# Patient Record
Sex: MALE | Race: AMERICAN INDIAN OR ALASKA NATIVE | NOT HISPANIC OR LATINO | URBAN - METROPOLITAN AREA
[De-identification: names, ages, dates, MRNs, and addresses within clinical notes are randomized per-mention and may not be internally consistent; named-entity substitution may affect disease eponyms.]

---

## 2023-02-08 ENCOUNTER — TRANSFERRED RECORDS (OUTPATIENT)
Dept: HEALTH INFORMATION MANAGEMENT | Facility: CLINIC | Age: 28
End: 2023-02-08

## 2023-02-09 ENCOUNTER — TELEPHONE (OUTPATIENT)
Dept: BEHAVIORAL HEALTH | Facility: HOSPITAL | Age: 28
End: 2023-02-09

## 2023-02-09 ENCOUNTER — HOSPITAL ENCOUNTER (INPATIENT)
Facility: HOSPITAL | Age: 28
LOS: 6 days | Discharge: HOME | End: 2023-02-15
Attending: PSYCHIATRY & NEUROLOGY | Admitting: STUDENT IN AN ORGANIZED HEALTH CARE EDUCATION/TRAINING PROGRAM
Payer: MEDICAID

## 2023-02-09 DIAGNOSIS — F33.2 SEVERE RECURRENT MAJOR DEPRESSION WITHOUT PSYCHOTIC FEATURES (H): ICD-10-CM

## 2023-02-09 DIAGNOSIS — E11.65 UNCONTROLLED TYPE 2 DIABETES MELLITUS WITH HYPERGLYCEMIA (H): Primary | ICD-10-CM

## 2023-02-09 LAB
ALT SERPL-CCNC: 35.9 U/L
AST SERPL-CCNC: 21.6 U/L
CREATININE (EXTERNAL): 0.6 MG/DL (ref 0.7–1.2)
GFR ESTIMATED (EXTERNAL): 136.83 ML/MIN/1.73M2
GLUCOSE (EXTERNAL): 374.1 MG/DL (ref 74–109)
GLUCOSE BLDC GLUCOMTR-MCNC: 250 MG/DL (ref 70–99)
GLUCOSE BLDC GLUCOMTR-MCNC: 341 MG/DL (ref 70–99)
GLUCOSE BLDC GLUCOMTR-MCNC: 403 MG/DL (ref 70–99)
POTASSIUM (EXTERNAL): 3.9 MMOL/L (ref 3.4–4.5)

## 2023-02-09 PROCEDURE — 124N000001 HC R&B MH

## 2023-02-09 RX ORDER — HYDROXYZINE HYDROCHLORIDE 25 MG/1
25 TABLET, FILM COATED ORAL EVERY 4 HOURS PRN
Status: DISCONTINUED | OUTPATIENT
Start: 2023-02-09 | End: 2023-02-15 | Stop reason: HOSPADM

## 2023-02-09 RX ORDER — CLONIDINE HYDROCHLORIDE 0.1 MG/1
0.1 TABLET ORAL 3 TIMES DAILY PRN
Status: ON HOLD | COMMUNITY
End: 2023-02-15

## 2023-02-09 RX ORDER — TRAZODONE HYDROCHLORIDE 100 MG/1
100 TABLET ORAL AT BEDTIME
Status: ON HOLD | COMMUNITY
End: 2023-02-15

## 2023-02-09 RX ORDER — DEXTROSE MONOHYDRATE 25 G/50ML
25-50 INJECTION, SOLUTION INTRAVENOUS
Status: DISCONTINUED | OUTPATIENT
Start: 2023-02-09 | End: 2023-02-15 | Stop reason: HOSPADM

## 2023-02-09 RX ORDER — PRAZOSIN HYDROCHLORIDE 1 MG/1
1 CAPSULE ORAL AT BEDTIME
Status: ON HOLD | COMMUNITY
End: 2023-02-15

## 2023-02-09 RX ORDER — METFORMIN HCL 500 MG
1000 TABLET, EXTENDED RELEASE 24 HR ORAL 2 TIMES DAILY WITH MEALS
Status: ON HOLD | COMMUNITY
End: 2023-02-10

## 2023-02-09 RX ORDER — ESCITALOPRAM OXALATE 20 MG/1
20 TABLET ORAL DAILY
Status: ON HOLD | COMMUNITY
End: 2023-02-15

## 2023-02-09 RX ORDER — OLANZAPINE 10 MG/1
10 TABLET ORAL 3 TIMES DAILY PRN
Status: DISCONTINUED | OUTPATIENT
Start: 2023-02-09 | End: 2023-02-15 | Stop reason: HOSPADM

## 2023-02-09 RX ORDER — NICOTINE POLACRILEX 4 MG
15-30 LOZENGE BUCCAL
Status: DISCONTINUED | OUTPATIENT
Start: 2023-02-09 | End: 2023-02-15 | Stop reason: HOSPADM

## 2023-02-09 RX ORDER — OLANZAPINE 10 MG/2ML
10 INJECTION, POWDER, FOR SOLUTION INTRAMUSCULAR 3 TIMES DAILY PRN
Status: DISCONTINUED | OUTPATIENT
Start: 2023-02-09 | End: 2023-02-15 | Stop reason: HOSPADM

## 2023-02-09 RX ORDER — HYDROXYZINE HYDROCHLORIDE 50 MG/1
50 TABLET, FILM COATED ORAL EVERY 6 HOURS PRN
Status: ON HOLD | COMMUNITY
End: 2023-02-10

## 2023-02-09 RX ORDER — LURASIDONE HYDROCHLORIDE 60 MG/1
60 TABLET, FILM COATED ORAL AT BEDTIME
Status: ON HOLD | COMMUNITY
End: 2023-02-10

## 2023-02-09 ASSESSMENT — ACTIVITIES OF DAILY LIVING (ADL)
DIFFICULTY_COMMUNICATING: NO
EQUIPMENT_CURRENTLY_USED_AT_HOME: CANE, QUAD
ADLS_ACUITY_SCORE: 30
CONCENTRATING,_REMEMBERING_OR_MAKING_DECISIONS_DIFFICULTY: NO
CHANGE_IN_FUNCTIONAL_STATUS_SINCE_ONSET_OF_CURRENT_ILLNESS/INJURY: NO
FALL_HISTORY_WITHIN_LAST_SIX_MONTHS: NO
DIFFICULTY_EATING/SWALLOWING: NO
DRESSING/BATHING_DIFFICULTY: NO
DOING_ERRANDS_INDEPENDENTLY_DIFFICULTY: NO
TOILETING_ISSUES: NO
WALKING_OR_CLIMBING_STAIRS_DIFFICULTY: NO
ADLS_ACUITY_SCORE: 30
WEAR_GLASSES_OR_BLIND: YES

## 2023-02-09 NOTE — TELEPHONE ENCOUNTER
S: Outside Facility [facility] , Charge RN Gurwinder calling at [phone].  27 year old/Male presenting with suicidal ideations with plan    B: Pt arrived via self-transport. Pt presents with increasing suicidal thoughts with plan. He has not been taking care of his diabetes and has been increasingly depressed with thoughts of taking all of his diabetic medications or cutting his wrists.  Pt affect in ED: flat, tearful  Pt Dx: Major Depressive Disorder  Previous IP hx? No  Pt endorses SI. Pt denies SIB.   Pt denies HI. Pt denies hallucinations.   Hx of suicide attempt? Unknown  Hx of aggression, or current concerns for aggression this visit? No  Pt is not prescribed medication.   Pt denies OP services  CD concerns: not at this time  Acute medical concerns: uncontrolled diabetes  Does Pt present with any of the following: assistive devices, insulin pump, J/G tube, catheter, CPAP, continuous IV, continuous O2, bariatric needs, ADA needs? No  Is Pt their own guardian? Yes  Pt is ambulatory  Pt is  able to perform ADLs independently    A: Pt meets criteria for review for Critical access hospital admission. Patient on a 72-hour hold. Hold initiated 2/9/23 at 0345  COVID: Negative  Utox: Negative  CMP: Abnormalities: Na+ 134 (136-145)  CBC: WNL  HCG: N/A    R: Patient accepted for behavioral bed placement: Yes        Accepted by Provider Dr. Villar    Admission to Inpatient Level of Care is indicated due to:     1. Patient risk of severity of behavioral health disorder is appropriate to proposed level of care as indicated by:   a. Imminent risk of harm to self Yes describe: increasing suicidal thinking. Thoughts of overdosing on diabetic medication or cutting wrists  b. Imminent risk of harm to others No   And/or  Behavioral health disorder is present and appropriate for inpatient care with both of the following:   a.  Severe psychiatric, behavioral or other comorbid conditions: Yes describe increased depression and suicidal thinking  b.   Severe dysfunction in daily living is present: Yes describe not taking care of his medical concerns and thinking of overdosing on his diabetic medications.   2.  Inpatient mental health services are necessary to meet patient needs based on:   a. Specific condition related to admission diagnosis is present and will likely improve with treatment at an inpatient level of care: Yes  b. Specific condition related to admission diagnosis will likely deteriorate in the absence of treatment at an inpatient level of care: Yes    3.  Situation and expectations are appropriate for inpatient care, as indicated by  one of the following:     A. Patient is unwilling to participate in treatment voluntarily and requires treatment. Yes   B. Is voluntary treatment at lower level of care feasible No  C. Around the clock medical and nursing care is for symptoms is required: Yes  D. Patient management at lower level of care is not appropriate and  biopsychosocial stressors may be contributing to clinical presentation. Yes

## 2023-02-09 NOTE — LETTER
[address] [address]  [phone]      February 15, 2023    Corie Granda Jr.   [id number]  [facility] [id number] [phone] (home)     : 1995      To Whom it may concern:    Corie Granda was hospitalized from 23 to 02/15/23.  Please excuse Corie from any work responsibilities during this time.  Additionally, it is recommended that Corie attend an Intensive Residential Treatment Services (IRTS) program post-hospitalization that he attend begin to attend in the coming week(s) when there is an intake bed available.  Please excuse Corie from any work responsibilities for the duration of the IRTS program which typically range in dates from 30 to 90 days. I do not anticipate any work restrictions when Corie does return to work outside of follow-up appointments with his medical providers. Thank you for your attention to this matter.       Sincerely,      Carlos Villar MD

## 2023-02-10 PROBLEM — E11.65 UNCONTROLLED TYPE 2 DIABETES MELLITUS WITH HYPERGLYCEMIA (H): Status: ACTIVE | Noted: 2021-09-21

## 2023-02-10 LAB
ANION GAP SERPL CALCULATED.3IONS-SCNC: 13 MMOL/L (ref 7–15)
BUN SERPL-MCNC: 15.3 MG/DL (ref 6–20)
CALCIUM SERPL-MCNC: 8.9 MG/DL (ref 8.6–10)
CHLORIDE SERPL-SCNC: 100 MMOL/L (ref 98–107)
CREAT SERPL-MCNC: 0.61 MG/DL (ref 0.67–1.17)
DEPRECATED HCO3 PLAS-SCNC: 22 MMOL/L (ref 22–29)
EST. AVERAGE GLUCOSE BLD GHB EST-MCNC: 303 MG/DL
GFR SERPL CREATININE-BSD FRML MDRD: >90 ML/MIN/1.73M2
GLUCOSE BLDC GLUCOMTR-MCNC: 241 MG/DL (ref 70–99)
GLUCOSE BLDC GLUCOMTR-MCNC: 248 MG/DL (ref 70–99)
GLUCOSE BLDC GLUCOMTR-MCNC: 260 MG/DL (ref 70–99)
GLUCOSE BLDC GLUCOMTR-MCNC: 284 MG/DL (ref 70–99)
GLUCOSE BLDC GLUCOMTR-MCNC: 299 MG/DL (ref 70–99)
GLUCOSE BLDC GLUCOMTR-MCNC: 312 MG/DL (ref 70–99)
GLUCOSE SERPL-MCNC: 347 MG/DL (ref 70–99)
HBA1C MFR BLD: 12.2 %
HOLD SPECIMEN: NORMAL
POTASSIUM SERPL-SCNC: 4.4 MMOL/L (ref 3.4–5.3)
SODIUM SERPL-SCNC: 135 MMOL/L (ref 136–145)

## 2023-02-10 PROCEDURE — 124N000001 HC R&B MH

## 2023-02-10 PROCEDURE — 80048 BASIC METABOLIC PNL TOTAL CA: CPT | Performed by: NURSE PRACTITIONER

## 2023-02-10 PROCEDURE — 36415 COLL VENOUS BLD VENIPUNCTURE: CPT | Performed by: NURSE PRACTITIONER

## 2023-02-10 PROCEDURE — 250N000012 HC RX MED GY IP 250 OP 636 PS 637: Performed by: NURSE PRACTITIONER

## 2023-02-10 PROCEDURE — 99222 1ST HOSP IP/OBS MODERATE 55: CPT | Performed by: NURSE PRACTITIONER

## 2023-02-10 PROCEDURE — 83036 HEMOGLOBIN GLYCOSYLATED A1C: CPT | Performed by: NURSE PRACTITIONER

## 2023-02-10 PROCEDURE — 250N000013 HC RX MED GY IP 250 OP 250 PS 637: Performed by: PSYCHIATRY & NEUROLOGY

## 2023-02-10 PROCEDURE — 250N000013 HC RX MED GY IP 250 OP 250 PS 637: Performed by: NURSE PRACTITIONER

## 2023-02-10 PROCEDURE — 99223 1ST HOSP IP/OBS HIGH 75: CPT | Mod: AI | Performed by: PSYCHIATRY & NEUROLOGY

## 2023-02-10 RX ORDER — LURASIDONE HYDROCHLORIDE 20 MG/1
20 TABLET, FILM COATED ORAL
Status: COMPLETED | OUTPATIENT
Start: 2023-02-10 | End: 2023-02-11

## 2023-02-10 RX ORDER — CLONIDINE HYDROCHLORIDE 0.1 MG/1
0.1 TABLET ORAL 3 TIMES DAILY PRN
Status: DISCONTINUED | OUTPATIENT
Start: 2023-02-10 | End: 2023-02-15 | Stop reason: HOSPADM

## 2023-02-10 RX ORDER — LURASIDONE HYDROCHLORIDE 40 MG/1
40 TABLET, FILM COATED ORAL
Status: COMPLETED | OUTPATIENT
Start: 2023-02-12 | End: 2023-02-13

## 2023-02-10 RX ORDER — ACETAMINOPHEN 500 MG
1000 TABLET ORAL 2 TIMES DAILY PRN
COMMUNITY

## 2023-02-10 RX ORDER — INSULIN GLARGINE 100 [IU]/ML
20 INJECTION, SOLUTION SUBCUTANEOUS AT BEDTIME
Status: ON HOLD | COMMUNITY
Start: 2023-01-26 | End: 2023-02-15

## 2023-02-10 RX ORDER — INSULIN ASPART 100 [IU]/ML
20 INJECTION, SOLUTION INTRAVENOUS; SUBCUTANEOUS
Status: ON HOLD | COMMUNITY
Start: 2023-01-26 | End: 2023-02-15

## 2023-02-10 RX ORDER — ESCITALOPRAM OXALATE 10 MG/1
20 TABLET ORAL DAILY
Status: DISCONTINUED | OUTPATIENT
Start: 2023-02-10 | End: 2023-02-15 | Stop reason: HOSPADM

## 2023-02-10 RX ORDER — SEMAGLUTIDE 1.34 MG/ML
0.25 INJECTION, SOLUTION SUBCUTANEOUS
Status: ON HOLD | COMMUNITY
Start: 2023-01-26 | End: 2023-02-15

## 2023-02-10 RX ORDER — CHLORAL HYDRATE 500 MG
1 CAPSULE ORAL DAILY
COMMUNITY

## 2023-02-10 RX ORDER — PRAZOSIN HYDROCHLORIDE 1 MG/1
1 CAPSULE ORAL AT BEDTIME
Status: DISCONTINUED | OUTPATIENT
Start: 2023-02-10 | End: 2023-02-10

## 2023-02-10 RX ORDER — LURASIDONE HYDROCHLORIDE 60 MG/1
60 TABLET, FILM COATED ORAL
Status: DISCONTINUED | OUTPATIENT
Start: 2023-02-14 | End: 2023-02-15 | Stop reason: HOSPADM

## 2023-02-10 RX ORDER — PRAZOSIN HYDROCHLORIDE 1 MG/1
2 CAPSULE ORAL AT BEDTIME
Status: DISCONTINUED | OUTPATIENT
Start: 2023-02-10 | End: 2023-02-15 | Stop reason: HOSPADM

## 2023-02-10 RX ORDER — ACETAMINOPHEN 325 MG/1
650 TABLET ORAL EVERY 4 HOURS PRN
Status: DISCONTINUED | OUTPATIENT
Start: 2023-02-10 | End: 2023-02-15 | Stop reason: HOSPADM

## 2023-02-10 RX ORDER — TRAZODONE HYDROCHLORIDE 100 MG/1
100 TABLET ORAL AT BEDTIME
Status: DISCONTINUED | OUTPATIENT
Start: 2023-02-10 | End: 2023-02-15 | Stop reason: HOSPADM

## 2023-02-10 RX ORDER — HYDROXYZINE PAMOATE 50 MG/1
1 CAPSULE ORAL EVERY 6 HOURS PRN
Status: ON HOLD | COMMUNITY
Start: 2022-11-15 | End: 2023-02-10

## 2023-02-10 RX ORDER — METFORMIN HCL 500 MG
1000 TABLET, EXTENDED RELEASE 24 HR ORAL 2 TIMES DAILY WITH MEALS
Status: DISCONTINUED | OUTPATIENT
Start: 2023-02-10 | End: 2023-02-15 | Stop reason: HOSPADM

## 2023-02-10 RX ADMIN — PRAZOSIN HYDROCHLORIDE 2 MG: 1 CAPSULE ORAL at 20:46

## 2023-02-10 RX ADMIN — LURASIDONE HYDROCHLORIDE 20 MG: 20 TABLET, FILM COATED ORAL at 17:21

## 2023-02-10 RX ADMIN — METFORMIN ER 500 MG 1000 MG: 500 TABLET ORAL at 07:48

## 2023-02-10 RX ADMIN — ACETAMINOPHEN 650 MG: 325 TABLET, FILM COATED ORAL at 11:20

## 2023-02-10 RX ADMIN — METFORMIN ER 500 MG 1000 MG: 500 TABLET ORAL at 17:21

## 2023-02-10 RX ADMIN — ESCITALOPRAM OXALATE 20 MG: 10 TABLET ORAL at 08:10

## 2023-02-10 RX ADMIN — INSULIN ASPART 3 UNITS: 100 INJECTION, SOLUTION INTRAVENOUS; SUBCUTANEOUS at 07:57

## 2023-02-10 RX ADMIN — INSULIN ASPART 4 UNITS: 100 INJECTION, SOLUTION INTRAVENOUS; SUBCUTANEOUS at 11:33

## 2023-02-10 RX ADMIN — TRAZODONE HYDROCHLORIDE 100 MG: 100 TABLET ORAL at 20:46

## 2023-02-10 RX ADMIN — INSULIN ASPART 3 UNITS: 100 INJECTION, SOLUTION INTRAVENOUS; SUBCUTANEOUS at 17:02

## 2023-02-10 RX ADMIN — CLONIDINE HYDROCHLORIDE 0.1 MG: 0.1 TABLET ORAL at 10:20

## 2023-02-10 ASSESSMENT — ACTIVITIES OF DAILY LIVING (ADL)
ADLS_ACUITY_SCORE: 30

## 2023-02-10 NOTE — H&P
[facility]    History and Physical  Medical Services       Date of Admission:  2/9/2023  Date of Service (when I saw the patient): 02/10/23    Assessment & Plan     Active Medical  Problems:    Uncontrolled type 2 diabetes mellitus with hyperglycemia (H)- noncompliant with his diabetes medications. He states he takes them sometimes and seldom uses his injectables. Last A1c 11/2/21 12.9. A1c today 12.2. He reports he was recently started on ozempic and has taken one dose last Thursday. He is also reports being prescribed Lantus 20 units. He states he takes his metformin daily, but may miss a dose occasionally.  Blood sugars trending down. 248, 260, 299 today. In the 300's and up to 403 yesterday. Consistent carb diet ordered, accuchecks qid, bedtime, and 0200 ordered with sliding scale insulin. Will see if the Diabetic educator can see the pt. Home meds- Ozempic ordered, Pt is also prescribed Novolog 20 units with meals, but has not been taking any of his insulin so will hold off on this for now and start him back slowly on his insulin regimen. Will start back with the Lantus 20 units at bedtime. Novolog with meals can be added later if needed. He does have sliding scale coverage. And Metformin is ordered.     Mild Hyponatremia- Na in the . Na today 135.  Stable. Reports a headache but states he gets headaches a lot when stressed. Notified nurse pt is requesting tylenol. Denies dizziness, nausea, vomiting. Nursing to continue to monitor. Instructed pt to report new or worsening symptoms to nurse. Pt verbalized understanding.        Code Status: Full Code    Nusrat Eid CNP    Primary Care Physician   Physician No Ref-Primary    Chief Complaint   Psych evaluation     History is obtained from the patient and medical chart     History of Present Illness   27 year old/Male presenting to [place] with suicidal ideations with plan. Pt arrived via self-transport. Pt presents with increasing suicidal  thoughts with plan. He has not been taking care of his diabetes and has been increasingly depressed with thoughts of taking all of his diabetic medications or cutting his wrists.    Past Medical History    I have reviewed this patient's medical history and updated it with pertinent information if needed.   No past medical history on file.    Past Surgical History   I have reviewed this patient's surgical history and updated it with pertinent information if needed.  No past surgical history on file.    Prior to Admission Medications   Prior to Admission Medications   Prescriptions Last Dose Informant Patient Reported? Taking?   Glucose Blood (FREESTYLE LITE TEST VI)   Yes Yes   Sig: Use one test strip to monitor blood glucose levels four times daily or as directed.   LANTUS SOLOSTAR 100 UNIT/ML soln   Yes No   Sig: Inject 20 Units Subcutaneous At Bedtime   NOVOLOG FLEXPEN 100 UNIT/ML soln   Yes No   Sig: Inject 20 Units Subcutaneous 3 times daily (with meals)   cloNIDine (CATAPRES) 0.1 MG tablet  Pharmacy Yes Yes   Sig: Take 0.1 mg by mouth 3 times daily as needed (Anxiety)   escitalopram (LEXAPRO) 20 MG tablet  Pharmacy Yes Yes   Sig: Take 20 mg by mouth daily   fish oil-omega-3 fatty acids 1000 MG capsule   Yes Yes   Sig: Take 1 g by mouth daily   metFORMIN (GLUCOPHAGE) 500 MG tablet   Yes No   Sig: Take 1,000 mg by mouth 2 times daily (with meals)   prazosin (MINIPRESS) 1 MG capsule  Pharmacy Yes Yes   Sig: Take 1 mg by mouth At Bedtime   semaglutide (OZEMPIC, 0.25 OR 0.5 MG/DOSE,) 2 MG/1.5ML SOPN pen   Yes Yes   Sig: Inject 0.25 mg Subcutaneous every 7 days -for 4 weeks, then inject 0.5 mg under the skin once weekly thereafter.   traZODone (DESYREL) 100 MG tablet  Pharmacy Yes Yes   Sig: Take 100 mg by mouth At Bedtime      Facility-Administered Medications: None     Allergies   No Known Allergies    Social History   I have reviewed this patient's social history and updated it with pertinent information if  needed. Corie Granda Jr.      Family History   I have reviewed this patient's family history and updated it with pertinent information if needed.   No family history on file.    Review of Systems   CONSTITUTIONAL:  negative  EYES:  negative  HEENT:  Negative except headache  RESPIRATORY:  negative  CARDIOVASCULAR:  negative  GASTROINTESTINAL:  negative  GENITOURINARY:  negative  INTEGUMENT/BREAST:  negative  HEMATOLOGIC/LYMPHATIC:  negative  ALLERGIC/IMMUNOLOGIC:  negative  ENDOCRINE:  negative  MUSCULOSKELETAL:  negative  NEUROLOGICAL:  negative    Physical Exam   Temp: 98  F (36.7  C) Temp src: Temporal BP: 123/73 Pulse: 69   Resp: 18 SpO2: 97 % O2 Device: None (Room air)    Vital Signs with Ranges  Temp:  [97.9  F (36.6  C)-98  F (36.7  C)] 98  F (36.7  C)  Pulse:  [69-73] 69  Resp:  [18] 18  BP: (123-160)/(73-95) 123/73  SpO2:  [97 %] 97 %  0 lbs 0 oz    Constitutional: awake, alert, cooperative, no apparent distress, and appears stated age, vitals stable   Eyes: Lids and lashes normal, pupils equal, round and reactive to light, extra ocular muscles intact, sclera clear, conjunctiva normal  ENT: Normocephalic, without obvious abnormality, atraumatic, external ears without lesions, oral pharynx with moist mucous membranes, no erythema or exudates  Hematologic / Lymphatic: no cervical lymphadenopathy  Respiratory: No increased work of breathing, good air exchange, clear to auscultation bilaterally, no crackles or wheezing  Cardiovascular: Normal apical impulse, regular rate and rhythm, normal S1 and S2, no S3 or S4, and no murmur noted  GI: obese, normal bowel sounds, soft, non-distended, non-tender, no masses palpated, no hepatosplenomegally  Genitounirinary: deferred  Skin: scar to right shoulder otherwise normal skin color, texture, turgor, no redness, warmth, or swelling and no rashes  Musculoskeletal: There is no redness, warmth, or swelling of the joints.  Full range of motion noted.   Neurologic: Awake,  alert, oriented to name, place and time.   Neuropsychiatric: General: depressed, guarded, calm and fair eye contact    Data   Data reviewed today:   Recent Labs   Lab 02/10/23  1120 02/10/23  1003 02/10/23  0747   NA  --  135*  --    POTASSIUM  --  4.4  --    CHLORIDE  --  100  --    CO2  --  22  --    BUN  --  15.3  --    CR  --  0.61*  --    ANIONGAP  --  13  --    CONSTANTINO  --  8.9  --    * 347* 248*       No results found for this or any previous visit (from the past 24 hour(s)).

## 2023-02-10 NOTE — PLAN OF CARE
Problem: Adult Behavioral Health Plan of Care  Goal: Patient-Specific Goal (Individualization)  Description: Patient will report at least 6-8 hours of sleep each night.  Patient will complete all ADL's independently while on the unit.   Patient will participate in at least 50% of group programming while on the unit.   Patient will be compliant with treatment team recommendations.  Patient will consume meals provided.     Note: Patient laying in bed with eyes closed, mild snoring and position changes noted all shift. Blood sugar check around 0230 was 312, no interventions at this time. Continue to monitor. Report given and care taken over by relieving RN at 0430.
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
## 2023-02-10 NOTE — DISCHARGE INSTRUCTIONS
Behavioral Discharge Planning and Instructions    Summary: Pt presented from the [facility] due to increased SI and anxiety.    Main Diagnosis: #Major Depressive Disorder, Recurrent, Severe  #PTSD  #Medication Nonadherence  #Type II DM, poor compliance with insulin    Health Care Follow-up:     [facility]  Med management: Yasmine Urena- Monday March 20th @ 12:30 PM  [address]  [place] [address]  Phone: [phone]    Referrals:    [facility]- 6-8 weeks out  All IRTS under the umbrella  [place]- [phone]  Fax: [phone]    [facility] - Two weeks out  Contact Clementine Torres  Address: [address]  [place] [address]  Phone: [phone]  Fax: [phone]    [facility]- 4 weeks out  [address] [place] [address]  Phone: [phone]    [facility]- 4 weeks out   [address] [place] [address]  Phone: [phone]      Attend all scheduled appointments with your outpatient providers. Call at least 24 hours in advance if you need to reschedule an appointment to ensure continued access to your outpatient providers.     Major Treatments, Procedures and Findings:  You were provided with: a psychiatric assessment, assessed for medical stability, medication evaluation and/or management, group therapy, family therapy, individual therapy, CD evaluation/assessment, milieu management, and medical interventions    Symptoms to Report: feeling more aggressive, increased confusion, losing more sleep, mood getting worse, or thoughts of suicide    Early warning signs can include: increased depression or anxiety sleep disturbances increased thoughts or behaviors of suicide or self-harm  increased unusual thinking, such as paranoia or hearing voices    Safety and Wellness:  Take all medicines as directed.  Make no changes unless your doctor suggests them.      Follow treatment recommendations.  Refrain from alcohol and  "non-prescribed drugs.  Ask your support system to help you reduce your access to items that could harm yourself or others. Items could include:  Firearms  Medicines (both prescribed and over-the-counter)  Knives and other sharp objects  Ropes and like materials  Car keys  If there is a concern for safety, call 911. If there is a concern for safety, call 911.    Resources:   Crisis Intervention: 764.961.6490 or 284-354-8749 (TTY: 462.310.9769).  Call anytime for help.  National [place] on Mental Illness (www.[place].antione.org): 995.392.6368 or 320-620-9168.  Alcoholics Anonymous (www.alcoholics-anonymous.org): Check your phone book for your local chapter.  Suicide Awareness Voices of Education (SAVE) (www.save.org): 989-461-JUXH (7183)  National Suicide Prevention Line (www.mentalhealthmn.org): 872-038-FJHI (5724)  Mental Health Consumer/Survivor Network of [place] (www.mhcsn.net): 164.503.5021 or 338-104-3799  Mental Health Association of [place] (www.mentalhealth.org): 763.520.7082 or 880-259-7711  Self- Management and Recovery Training., SMART-- Toll free: 245.424.7223  www.[facility].[employer]  Text 4 Life: txt \"LIFE\" to 25025 for immediate support and crisis intervention  Crisis text line: Text \"[place]\" to 065276. Free, confidential, 24/7.  Crisis Intervention: 945.511.7530 or 360-575-3962. Call anytime for help.     General Medication Instructions:   See your medication sheet(s) for instructions.   Take all medicines as directed.  Make no changes unless your doctor suggests them.   Go to all your doctor visits.  Be sure to have all your required lab tests. This way, your medicines can be refilled on time.  Do not use any drugs not prescribed by your doctor.  Avoid alcohol.    Advance Directives:   Scanned document on file with [place]? No scanned doc  Is document scanned? Pt states no documents  Honoring Choices Your Rights Handout: Informed and given  Was more information offered? Pt declined    The Treatment team has appreciated " the opportunity to work with you. If you have any questions or concerns about your recent admission, you can contact the unit which can receive your call 24 hours a day, 7 days a week. They will be able to get in touch with a Provider if needed. The unit number is [phone] .

## 2023-02-10 NOTE — PLAN OF CARE
ADMISSION NOTE    Reason for admission SI.  Safety concerns SI.  Risk for or history of violence No .   Full skin assessment:  Pt. skin intact, Pt does have a significant keloid scar on right shoulder.     Patient arrived on unit from [facility] accompanied by Staff on 2/9/2023  19:05 PM.   Status on arrival: Ambulatory and cooperative  /95 (BP Location: Right arm)   Pulse 73   Temp 97.9  F (36.6  C) (Temporal)   Patient given tour of unit and Welcome to  unit papers given to patient, wanding completed, belongings inventoried, and admission assessment completed.   Patient's legal status on arrival is Hold. Appropriate legal rights discussed with and copy given to patient. Patient Bill of Rights discussed with and copy given to patient.   Patient denies SI, HI, and thoughts of self harm and contracts for safety while on unit.      Mary Lloyd RN  2/9/2023  7:29 PM               Problem: Adult Behavioral Health Plan of Care  Goal: Patient-Specific Goal (Individualization)  Description: Patient will report at least 6-8 hours of sleep each night.    Patient will complete all ADL's independently while on the unit.     Patient will participate in at least 50% of group programming while on the unit.     Patient will be compliant with treatment team recommendations.    Patient will consume meals provided.   Outcome: Progressing     Problem: Suicide Risk  Goal: Absence of Self-Harm  Outcome: Progressing   Goal Outcome Evaluation:            Pt. Arrived ambulatory and compliant Pt. Is polite but quiet and flat. Pt. Did attend a group as soon as he arrived on unit. Pt. Did state he had some SI and anxiety.     Pt. BG taken when he arrived it was 250.    Pt. Concerned that he might miss his Ozempic and would like that tonight.     Pt. BG at 2107 was 403      Pt.  / MAR Order/ contact provider if the BG is >350 . Contacted Provider on call April P. No new orders at this time. Advised to check BS at 0200 and  to notify the provider on call again if over the  parameters at that time.        Pt. would like to [place] with [place]/ His  Mosque beliefs.     Pt. BG rechecked at 2150 Pt BG was 341 at that time.      Face to face report will be communicated to oncoming RN.    Mary Lloyd RN  2/9/2023  10:40 PM

## 2023-02-10 NOTE — PROGRESS NOTES
02/09/23 2007   Patient Belongings   Did you bring any home meds/supplements to the hospital?  Yes   Disposition of meds  Sent to security/pharmacy per site process   Patient Belongings remains with patient;locker;sent to security per site process   Patient Belongings Remaining with Patient glasses;shoes   Patient Belongings Put in Hospital Secure Location (Security or Locker, etc.) cash/credit card;cell phone/electronics;clothing;medication(s);tote;briefcase  (Black hoodie, gray t-shirt, black sweatpants, red underwear and socks, black back pack, deorant, charging cord and 2 blocks, shampoo and conditioner in one, body wash, tooth brush x2, game of throne books x5, socks x7, black t-shirt x2.)   Belongings Search Yes   Clothing Search Yes   Second Staff Mary RN   Comment 5 pairs of underwear, tooth paste, hairbrush, nike bag. sent to safe: samsung phone with black case no cracks or scratches, wireless headphone, black wallet, $40.00, blue cross card x2, red [place] card- name on the card kia, covid vaccine card, drivers license, [place] card, quicksilver capitalone card, red visa card, pink mastercard, purple mastercard, avenger cards.     List items sent to safe: samsung phone with black case no cracks or scratches, wireless headphone, black wallet, $40.00, blue cross card x2, red calderon card- name on the card kia, covid vaccine card, drivers license, [place] card, quicksilver capitalone card, red visa card, pink mastercard, purple mastercard, avenger cards.  All other belongings put in assigned cubby in belongings room.       I have reviewed my belongings list on admission and verify that it is correct.     Patient signature_______________________________    Second staff witness (if patient unable to sign) ______________________________       I have received all my belongings at discharge.    Patient signature________________________________    CRYSTAL  2/9/2023  8:35 PM

## 2023-02-10 NOTE — PHARMACY
Pharmacy used patient's own Lantus and Ozempic pens due to Ozempic being non-formulary, and Lantus pen being out of the refrigerator and needing to be used within 28 days of being taken out of the fridge.    Briseida Lion, PharmD on 2/10/2023
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
## 2023-02-10 NOTE — PROGRESS NOTES
Called and left message regarding rescheduling appointment.    Patient having urinary frequency several days-up 4-5 times nightly. Flow less. Advised move appointment up-rescheduled for tomorrow 11/6/19.

## 2023-02-10 NOTE — PROVIDER NOTIFICATION
Pt.  / MAR Order/ contact provider if the BG is >350 . Contacted Provider on call April P. No new orders at this time. Advised to check BS at 0200 and to notify the provider on call again if over the  parameters at that time.

## 2023-02-10 NOTE — PLAN OF CARE
Face to face shift report received from Paxton MEREDITH. Rounding completed, pt observed in the lounge at the start of the shift.    Patient in and out of room throughout the day sitting in the chair by the window in the lounge. Alert and making needs known. Quiet but pleasant during conversation with this writer. Compliant with scheduled medication and nursing assessment. Received 3 units of insulin before breakfast for a BG level of 248. Received 4 units before lunch for a BG level of 299. Reports anxiety and depression this morning. Offered and accepted clonidine 0.1 mg at 1020. Patient states this was effective and has worked well for him in the past. Requested and received tylenol 650 mg for a 6/10 headache. No further intervention needed. Ate 100% of both meals. Attended a group in the afternoon. Encouraged to utilize PRN's when needed and let nursing staff know.    Problem: Adult Behavioral Health Plan of Care  Goal: Patient-Specific Goal (Individualization)  Description: Patient will report at least 6-8 hours of sleep each night.  Patient will complete all ADL's independently while on the unit.   Patient will participate in at least 50% of group programming while on the unit.   Patient will be compliant with treatment team recommendations.  Patient will consume meals provided.   Outcome: Progressing     Problem: Suicide Risk  Goal: Absence of Self-Harm  Description: Patient will contract for safety if having thoughts of self harm.   Patient will report absence of suicidal ideations prior to discharge.   Outcome: Progressing    Face to face report will be communicated to oncoming RN.    Erin Johnson RN  2/10/2023

## 2023-02-10 NOTE — PLAN OF CARE
Goal Outcome Evaluation:    Report received from Heena KELLEY RN. Rounding completed. Patient observed in [facility].         This AM pt's blood glucose was 260.     Face to face report given with opportunity to observe patient.    Report given to oncoming RN    Paxton Juarez RN   2/10/2023  7:12 AM

## 2023-02-10 NOTE — PLAN OF CARE
Face to face shift report received from Nurse. Rounding completed, pt observed.            Problem: Adult Behavioral Health Plan of Care  Goal: Patient-Specific Goal (Individualization)  Description: Patient will report at least 6-8 hours of sleep each night.  Patient will complete all ADL's independently while on the unit.   Patient will participate in at least 50% of group programming while on the unit.   Patient will be compliant with treatment team recommendations.  Patient will consume meals provided.   Outcome: Progressing     Problem: Suicide Risk  Goal: Absence of Self-Harm  Description: Patient will contract for safety if having thoughts of self harm.   Patient will report absence of suicidal ideations prior to discharge.   Outcome: Progressing         Pt. Compliant to medication pass and polite. Pt denies SI/ and no SIB at this time.       Pt. attempting groups and consuming meals     1700 Pt.  Pt. Received 3u  Novolog/Order at 1702    2028 Pt. Bg 284 Pt received Novolog 2u at 2047 and Lantus  20u/ order for HS    Face to face report will be communicated to oncoming RN.    Mary Lloyd RN  2/10/2023  9:50 PM

## 2023-02-10 NOTE — CARE PLAN
"Prior to Admission Medication Reconciliation:     Medications added:   [] None  [x] As listed below:    Ozempic, lantus and novolog    Medications deleted:   [] None  [x] As listed below:    Latuda, hydroxyzine- discontinued at [place] [facility]    levemir- given 15 ds at [facility], no fills after. Pt on novolog and lantus    Medications marked for review/removal by attending:  [x] None  [] As listed below:    Changes made to existing medications:   [] None  [x] Updated time of day, strengths and frequencies to most current.     Metformin from XR to IR    Pertinent notes/medications patient takes different than prescribed:     Pt has not been particularly compliant with insulin. Only takes novolog with \"big meals\", only takes lantus when his mental health is doing well.     Prazosin- reports he hasn't been taking because he couldn't find the bottle.     ozempic- pt takes on Thursdays, is on his third week. Did not get his dose yesterday.     Last times/dates taken verified with patient:  [x] Yes- completed myself   [] Nurse completed, no changes made (double checked entries)  [] Unable to review with patient at this time:    Allergy review:    []Did not review: reviewed by nursing  [x]Allergies reviewed and updated if needed.     Medication reconciliation sources:   [x]Patient  []Patient family member/emergency contact: **  [][facility] Report Review  []Epic Chart Review  []Care Everywhere review  [x]Pharmacy med list/phone call: [facility] [phone]  1/26/23 fill/ picked up    Fish oil 1000 mg daily     Insulin aspart 100 units/mL 20 units under the skin with meals     Insulin glargine 100 units/mL 20 units subcutaneous at bedtime     Metformin 500 mg 2 tabs BID     semaglutide 0.5 mg /0.375 ml inject 0.25 mg under the skin weekly for 4 weeks, then inject 0.5 mg weekly     Non [place] active meds:    Clonidine 0.1 mg TID PRN    escitalopram 20 mg daily     Prazosin 1 mg at bedtime     Trazodone 100 " mg at bedtime     Inactive/discontinued    exenatide 2 mg weekly    hydroxyzine  50 mg at bedtime     latuda 60 mg daily     Sertraline 100 mg daily       []Fill dates reflect compliancy. No concerns.  []Fill dates do not reflect compliancy on the following medications:   [x]Outside meds dispense report:   [x]Fill dates reflect compliancy. No concerns.  []Fill dates do not reflect compliancy on the following medications:   []HomeCare medlist, Nursing home or Assisted Living MAR:  []Behavioral Health Provider:  [x]Other: [facility] Discharge medlist (1/12/23)-for reference      Pharmacy desired at discharge: [facility]    Is patient on coumadin?   [x]No  []Yes      Fill dates and reported compliancy:  [x] Compliant/non-compliant: fill dates reflect reported compliancy.   [] Not applicable. Patient is not taking any prescribed maintenance medications at this time.   [] Fill dates do not coincide with compliancy, but reports compliancy.    [] Fill dates reflect compliancy, but reports non-compliancy.   [] Cannot assess at this time.     Historian accuracy:  [] Excellent- alert and oriented, understands why meds were prescribed and how to take, able to answer specifics  [x] Good- alert and oriented, understands why meds were prescribed and how to take, some confusion   [] Fair- alert and oriented, doesn't know medications without list, cannot answer specifics about medications, but has a decent process for which to take at home  [] Poor- does not know medications, may not have a process to take at home, may be cognitively unable to review at this time  []Medication management done by family member or facility, no concerns about historian accuracy.   [] Cannot assess at this time.     Medication Management:  [x] Manages meds independently  [] Family member/ other party manages meds/assists:  [] Meds managed by staff at facility  [] Meds set up by home care, family/other party helps administer  [] Meds set up by home  care, self administers  [] Cannot assess at this time.     Other medications aside from PTA:  [x] Denies taking any other medications aside from those listed in PTA meds, this includes over-the-counter vitamins, supplements and analgesics.   [] Unable to confirm at this time.     Christy Malloy on 2/10/2023 at 11:11 AM     Notifying appropriate party of changes/additions/discrepancies:  [x]No pertinent changes made, notification not necessary.   [] Notified attending provider via text page/phone call/sticky note or other:  [] Notified other:  [] Medications have not been reconciled by a provider yet, notification not necessary  [] Pt is not admitted to floor yet or patient is boarding, PTA meds completed before admission.     Medications Prior to Admission   Medication Sig Dispense Refill Last Dose     acetaminophen (TYLENOL) 500 MG tablet Take 1,000 mg by mouth 2 times daily as needed (headaches)   Past Week     cloNIDine (CATAPRES) 0.1 MG tablet Take 0.1 mg by mouth 3 times daily as needed (Anxiety)   Past Week     escitalopram (LEXAPRO) 20 MG tablet Take 20 mg by mouth daily   Past Week     fish oil-omega-3 fatty acids 1000 MG capsule Take 1 g by mouth daily   Past Week     LANTUS SOLOSTAR 100 UNIT/ML soln Inject 20 Units Subcutaneous At Bedtime   Past Month     metFORMIN (GLUCOPHAGE) 500 MG tablet Take 1,000 mg by mouth 2 times daily (with meals)   Past Week     NOVOLOG FLEXPEN 100 UNIT/ML soln Inject 20 Units Subcutaneous 3 times daily (with meals)   Unknown     prazosin (MINIPRESS) 1 MG capsule Take 1 mg by mouth At Bedtime   Unknown     semaglutide (OZEMPIC, 0.25 OR 0.5 MG/DOSE,) 2 MG/1.5ML SOPN pen Inject 0.25 mg Subcutaneous every 7 days -for 4 weeks on Thursday, then inject 0.5 mg under the skin once weekly thereafter.   2/1/2023     traZODone (DESYREL) 100 MG tablet Take 100 mg by mouth At Bedtime        Glucose Blood (FREESTYLE LITE TEST VI) Use one test strip to monitor blood glucose levels four times  daily or as directed.

## 2023-02-11 LAB
GLUCOSE BLDC GLUCOMTR-MCNC: 244 MG/DL (ref 70–99)
GLUCOSE BLDC GLUCOMTR-MCNC: 265 MG/DL (ref 70–99)
GLUCOSE BLDC GLUCOMTR-MCNC: 268 MG/DL (ref 70–99)
GLUCOSE BLDC GLUCOMTR-MCNC: 279 MG/DL (ref 70–99)
GLUCOSE BLDC GLUCOMTR-MCNC: 288 MG/DL (ref 70–99)

## 2023-02-11 PROCEDURE — 124N000001 HC R&B MH

## 2023-02-11 PROCEDURE — 99233 SBSQ HOSP IP/OBS HIGH 50: CPT | Mod: GT | Performed by: PSYCHIATRY & NEUROLOGY

## 2023-02-11 PROCEDURE — 250N000013 HC RX MED GY IP 250 OP 250 PS 637: Performed by: PSYCHIATRY & NEUROLOGY

## 2023-02-11 RX ADMIN — METFORMIN ER 500 MG 1000 MG: 500 TABLET ORAL at 17:35

## 2023-02-11 RX ADMIN — METFORMIN ER 500 MG 1000 MG: 500 TABLET ORAL at 07:50

## 2023-02-11 RX ADMIN — TRAZODONE HYDROCHLORIDE 100 MG: 100 TABLET ORAL at 20:38

## 2023-02-11 RX ADMIN — ESCITALOPRAM OXALATE 20 MG: 10 TABLET ORAL at 07:51

## 2023-02-11 RX ADMIN — INSULIN ASPART 3 UNITS: 100 INJECTION, SOLUTION INTRAVENOUS; SUBCUTANEOUS at 07:51

## 2023-02-11 RX ADMIN — PRAZOSIN HYDROCHLORIDE 2 MG: 1 CAPSULE ORAL at 20:38

## 2023-02-11 RX ADMIN — INSULIN ASPART 3 UNITS: 100 INJECTION, SOLUTION INTRAVENOUS; SUBCUTANEOUS at 11:35

## 2023-02-11 RX ADMIN — LURASIDONE HYDROCHLORIDE 20 MG: 20 TABLET, FILM COATED ORAL at 17:35

## 2023-02-11 RX ADMIN — INSULIN ASPART 3 UNITS: 100 INJECTION, SOLUTION INTRAVENOUS; SUBCUTANEOUS at 16:32

## 2023-02-11 ASSESSMENT — ACTIVITIES OF DAILY LIVING (ADL)
ADLS_ACUITY_SCORE: 30
HYGIENE/GROOMING: INDEPENDENT
ADLS_ACUITY_SCORE: 30
ORAL_HYGIENE: INDEPENDENT
LAUNDRY: UNABLE TO COMPLETE
DRESS: SCRUBS (BEHAVIORAL HEALTH);INDEPENDENT
ADLS_ACUITY_SCORE: 30

## 2023-02-11 NOTE — PROGRESS NOTES
"  [facility] PSYCHIATRY  -  PROGRESS NOTE     ID   Name: Corie Granda Jr.  MRN#: [id number]     SUBJECTIVE   Prior to interviewing the patient, I met with nursing and reviewed patient's clinical condition. We discussed clinical care both before and after the interview. I have reviewed the patient's clinical course by review of records including previous notes, labs, and vital signs.     Per nursing, the patient had the following behavioral events over the last 24-hours: none.     On psychiatric interview, Corie is met within the milieu. He states he is \"not good\". He continues to report feeling \"Depressed\". He states his mood is low, and his energy is low. He states \"I struggle the most with mood drops, I could be on a temporary high but once reality sets in and I realize what is going on in my life, it just drops and things start to become suicidal\". He states he continues to have thoughts that he would be better off dead. He states he has limited hope. He is interested in going to an IRTS. We talked about self gratitude today and he is going to take a self-inventory and discuss this with writer tomorrow. He agrees to modifications with his lantus.          MEDICATIONS   Scheduled Meds:    escitalopram  20 mg Oral Daily     insulin aspart  1-7 Units Subcutaneous TID AC     insulin aspart  1-5 Units Subcutaneous At Bedtime     insulin glargine  20 Units Subcutaneous At Bedtime     lurasidone  20 mg Oral Daily with supper    And     [START ON 2/12/2023] lurasidone  40 mg Oral Daily with supper    And     [START ON 2/14/2023] lurasidone  60 mg Oral Daily with supper     metFORMIN  1,000 mg Oral BID w/meals     prazosin  2 mg Oral At Bedtime     semaglutide  0.25 mg Subcutaneous Q7 Days     traZODone  100 mg Oral At Bedtime     PRN Meds:.acetaminophen, cloNIDine, glucose **OR** dextrose **OR** glucagon, hydrOXYzine, OLANZapine **OR** OLANZapine     ALLERGIES   No Known Allergies     VITALS   Vitals: /61 (BP " "Location: Right arm)   Pulse 69   Temp 97.5  F (36.4  C) (Temporal)   Resp 16   SpO2 97%      MENTAL STATUS EXAM   Appearance:  awake, alert, adequately groomed, dressed in hospital scrubs, and appeared as age stated  Attitude:  cooperative  Eye Contact:  good  Mood:  \"depressed\"  Affect:  mood congruent  Speech:  clear, coherent  Psychomotor Behavior:  no evidence of tardive dyskinesia, dystonia, or tics  Thought Process:  logical, linear, and goal oriented  Associations:  no loose associations  Thought Content:  no evidence of psychotic thought  Insight:  limited  Judgment:  limited  Oriented to:  time, person, and place  Attention Span and Concentration:  intact  Recent and Remote Memory:  intact  Gait and Station: Normal     LABS   Recent Results (from the past 24 hour(s))   Glucose by meter    Collection Time: 02/10/23 11:20 AM   Result Value Ref Range    GLUCOSE BY METER POCT 299 (H) 70 - 99 mg/dL   Glucose by meter    Collection Time: 02/10/23  5:00 PM   Result Value Ref Range    GLUCOSE BY METER POCT 241 (H) 70 - 99 mg/dL   Glucose by meter    Collection Time: 02/10/23  8:28 PM   Result Value Ref Range    GLUCOSE BY METER POCT 284 (H) 70 - 99 mg/dL   Glucose by meter    Collection Time: 02/11/23  3:07 AM   Result Value Ref Range    GLUCOSE BY METER POCT 265 (H) 70 - 99 mg/dL   Glucose by meter    Collection Time: 02/11/23  6:39 AM   Result Value Ref Range    GLUCOSE BY METER POCT 244 (H) 70 - 99 mg/dL         ASSESSMENT   Corei Granda  is a 27 year old male with a past psychiatric history notable for depression, anxiety. Has had multiple prior inpatient psychiatric hospitalizations. Prior suicide attempt via overdose (x2, 2016, 2021). Presents to outside emergency department with worsening depressive symptoms and increasing intensity and frequency of suicidal ideation with multiple plans in the setting of medication nonadherence and mounting psychosocial stressors. He was placed on an involuntary 72 " hour hold. Patient was subsequently transferred and accepted for inpatient psychiatric hospitalization at [facility] for further safety and further stabilization     Daily Progress: remains dysphoric. Tolerating addition back of his psychotropic medications, continue without change. Morning glucose remains high, will increase bedtime lantus by 5 mg at bedtime.        DIAGNOSTIC FORMULATION   #Major Depressive Disorder, Recurrent, Severe  #PTSD  #Medication Nonadherence  #Type II DM, poor compliance with insulin     PLAN     Location: Unit 5  Legal Status: 72 hour hold  Safety Assessment:    Behavioral Orders   Procedures     Code 1 - Restrict to Unit     Routine Programming     As clinically indicated     Status 15     Every 15 minutes.        PTA medications held:   -none     PTA medications continued/changed:   -lurasidone 60 mg q daily (will titrate up from 20 mg q daily)  -escitalopram 20 mg q daily  -prazosin -> increased to 2 mg (2/10/23)     New medications initiated:   -none    Today's Changes:  - increase evening lantus     Programming: Patient will be treated in a therapeutic milieu with appropriate individual and group therapies. Education will be provided on diagnoses, medications, and treatments. Encouraged behavioral activation and participation in group programming.     Medical diagnoses:  Per medicine    Disposition: pending clinical course       TREATMENT TEAM CARE PLAN     Progress: Continued symptoms.    Continued Stay Criteria/Rationale: Continued symptoms without sufficient improvement/resolution.    Medical/Physical: See above.    Precautions: See above.     Plan: Continue inpatient care with unit support and medication management.    Rationale for change in precautions or plan: NA due to no change.    Participants: Carlos Villar MD, Nursing, SW, OT.    The patient's care was discussed with the treatment team and chart notes were reviewed.        ATTESTATION    Carlos Villar MD  [facility]   Psychiatry    Video Visit: Patient has given verbal consent for video visit?: Yes  Type of Service: video visit for mental health treatment  Reason for Video Visit: COVID-19 [place]  Originating Site (patient location): [facility]  Distant Site (provider location): Remote Location  Mode of Communication: Video Conference via [employer]  Time of Service: Date: February 11, 2023 , Start: 10:00 end: 10:30

## 2023-02-11 NOTE — PLAN OF CARE
Face to face end of shift report recieved from evening shift RN. Rounding completed. Pt observed in their own bed, resting with eyes closed and notable respirations. Will continue to support the needs of the patient. Pt slept approximately 6 hours.  BGL at 0307- 265  BGL at 06:39- 244  Face to face shift report will be communicated with oncoming RN.   Kerri Morin RN  2/11/2023  6:29 AM  Problem: Adult Behavioral Health Plan of Care  Goal: Patient-Specific Goal (Individualization)  Description: Patient will report at least 6-8 hours of sleep each night.  Patient will complete all ADL's independently while on the unit.   Patient will participate in at least 50% of group programming while on the unit.   Patient will be compliant with treatment team recommendations.  Patient will consume meals provided.   Outcome: Progressing     Problem: Suicide Risk  Goal: Absence of Self-Harm  Description: Patient will contract for safety if having thoughts of self harm.   Patient will report absence of suicidal ideations prior to discharge.   Outcome: Progressing

## 2023-02-12 LAB
GLUCOSE BLDC GLUCOMTR-MCNC: 209 MG/DL (ref 70–99)
GLUCOSE BLDC GLUCOMTR-MCNC: 221 MG/DL (ref 70–99)
GLUCOSE BLDC GLUCOMTR-MCNC: 244 MG/DL (ref 70–99)
GLUCOSE BLDC GLUCOMTR-MCNC: 258 MG/DL (ref 70–99)
GLUCOSE BLDC GLUCOMTR-MCNC: 264 MG/DL (ref 70–99)

## 2023-02-12 PROCEDURE — 99232 SBSQ HOSP IP/OBS MODERATE 35: CPT | Mod: GT | Performed by: PSYCHIATRY & NEUROLOGY

## 2023-02-12 PROCEDURE — 124N000001 HC R&B MH

## 2023-02-12 PROCEDURE — 250N000013 HC RX MED GY IP 250 OP 250 PS 637: Performed by: PSYCHIATRY & NEUROLOGY

## 2023-02-12 RX ADMIN — PRAZOSIN HYDROCHLORIDE 2 MG: 1 CAPSULE ORAL at 21:41

## 2023-02-12 RX ADMIN — CLONIDINE HYDROCHLORIDE 0.1 MG: 0.1 TABLET ORAL at 11:03

## 2023-02-12 RX ADMIN — METFORMIN ER 500 MG 1000 MG: 500 TABLET ORAL at 07:48

## 2023-02-12 RX ADMIN — INSULIN ASPART 2 UNITS: 100 INJECTION, SOLUTION INTRAVENOUS; SUBCUTANEOUS at 07:48

## 2023-02-12 RX ADMIN — ESCITALOPRAM OXALATE 20 MG: 10 TABLET ORAL at 07:47

## 2023-02-12 RX ADMIN — INSULIN ASPART 3 UNITS: 100 INJECTION, SOLUTION INTRAVENOUS; SUBCUTANEOUS at 11:37

## 2023-02-12 RX ADMIN — INSULIN ASPART 2 UNITS: 100 INJECTION, SOLUTION INTRAVENOUS; SUBCUTANEOUS at 17:08

## 2023-02-12 RX ADMIN — METFORMIN ER 500 MG 1000 MG: 500 TABLET ORAL at 17:08

## 2023-02-12 RX ADMIN — TRAZODONE HYDROCHLORIDE 100 MG: 100 TABLET ORAL at 21:41

## 2023-02-12 RX ADMIN — LURASIDONE HYDROCHLORIDE 40 MG: 40 TABLET, FILM COATED ORAL at 17:08

## 2023-02-12 ASSESSMENT — ACTIVITIES OF DAILY LIVING (ADL)
ADLS_ACUITY_SCORE: 30

## 2023-02-12 NOTE — PLAN OF CARE
Took over care for pt from 5 am to 7 am . Pt appeared to sleep 6.75 hours through the night. 15 min checks completed.  Pt did not exhibit any suicidal/self-harm behaviors.        Face to face end of shift report communicated to oncoming RN.     Elsa Abbott RN  2/12/2023

## 2023-02-12 NOTE — PLAN OF CARE
Face to face shift report received from Elsa MEREDITH. Rounding completed, pt observed in lounge at the start of the shift.    Patient in and out room throughout the day. Spends a lot of time reading in room or in chair by the window. Alert and making needs known. Ate 100% of breakfast and lunch. Pleasant during conversation with this writer appearing to have a bright affect more frequently today. More engaged in conversation telling this writer he wanted to be in the medical field when he initially went to school. 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 He eventually would like to be an addiction counselor. Compliant with scheduled medication and nursing assessment. Reports increased anxiety with depression this morning. Offered and accepted Clonidine 0.1 mg at 1103. No further intervention needed. Denies hallucinations, SI/HI, and pain.    BG @ 0629: 221/ 2 units given before breakfast  BG @ 1103: 244/ 3 units given before lunch    Problem: Adult Behavioral Health Plan of Care  Goal: Patient-Specific Goal (Individualization)  Description: Patient will report at least 6-8 hours of sleep each night.  Patient will complete all ADL's independently while on the unit.   Patient will participate in at least 50% of group programming while on the unit.   Patient will be compliant with treatment team recommendations.  Patient will consume meals provided.   Outcome: Progressing     Problem: Suicide Risk  Goal: Absence of Self-Harm  Description: Patient will contract for safety if having thoughts of self harm.   Patient will report absence of suicidal ideations prior to discharge.   Outcome: Progressing    Face to face report will be communicated to oncjeyson MEREDITH.    Erin Johnson RN  2/12/2023

## 2023-02-12 NOTE — PLAN OF CARE
"SHIFT SUMMARY:  Calm and cooperative. Denies pain and suicidal ideation. Reports depression \"about the same;\" Contracts for safety.  Spends time in the lounge chair, looking out the window and reading a bible. Taking medications as prescribed. Not attending group. At 16:32, blood glucose of 279; Per order, administered 3 units of novolog subcutaneously. At 2035, blood glucose of 268; Per order, 2 units of novolog subcutaneously administered.  At 02:20, blood glucose of 258.      Problem: Adult Behavioral Health Plan of Care  Goal: Patient-Specific Goal (Individualization)  Description: Patient will report at least 6-8 hours of sleep each night.  Patient will complete all ADL's independently while on the unit.   Patient will participate in at least 50% of group programming while on the unit.   Patient will be compliant with treatment team recommendations.  Patient will consume meals provided.   Outcome: Progressing     Problem: Suicide Risk  Goal: Absence of Self-Harm  Description: Patient will contract for safety if having thoughts of self harm.   Patient will report absence of suicidal ideations prior to discharge.   Outcome: Progressing   Goal Outcome Evaluation:                        "

## 2023-02-12 NOTE — PROGRESS NOTES
"  [facility] PSYCHIATRY  -  PROGRESS NOTE     ID   Name: Corie Granda Jr.  MRN#: [id number]     SUBJECTIVE   Prior to interviewing the patient, I met with nursing and reviewed patient's clinical condition. We discussed clinical care both before and after the interview. I have reviewed the patient's clinical course by review of records including previous notes, labs, and vital signs.     Per nursing, the patient had the following behavioral events over the last 24-hours: none.     On psychiatric interview, Corie is met within his room. He did not complete a self-inventory yesterday. We reviewed this again and he stated he will complete. He has been sleeping better. He notes that he has not had any nightmares since increase in prazosin and will continue to monitor. He continues to note suicidal ideation with thoughts that he would be better off dead. He reports attending groups. He is also reading again on the unit which he has not been able to do in some time. Denies physical pain. Agrees to continue to adjust his insulin.          MEDICATIONS   Scheduled Meds:    escitalopram  20 mg Oral Daily     insulin aspart  1-7 Units Subcutaneous TID AC     insulin aspart  1-5 Units Subcutaneous At Bedtime     insulin glargine  30 Units Subcutaneous At Bedtime     lurasidone  40 mg Oral Daily with supper    And     [START ON 2/14/2023] lurasidone  60 mg Oral Daily with supper     metFORMIN  1,000 mg Oral BID w/meals     prazosin  2 mg Oral At Bedtime     semaglutide  0.25 mg Subcutaneous Q7 Days     traZODone  100 mg Oral At Bedtime     PRN Meds:.acetaminophen, cloNIDine, glucose **OR** dextrose **OR** glucagon, hydrOXYzine, OLANZapine **OR** OLANZapine     ALLERGIES   No Known Allergies     VITALS   Vitals: /57 (BP Location: Left arm)   Pulse 70   Temp 98.4  F (36.9  C) (Temporal)   Resp 18   Ht 1.778 m (5' 10\")   Wt (!) 156.2 kg (344 lb 4.8 oz)   SpO2 95%   BMI 49.40 kg/m       MENTAL STATUS EXAM " "  Appearance:  awake, alert, adequately groomed, dressed in hospital scrubs, and appeared as age stated  Attitude:  cooperative  Eye Contact:  good  Mood:  \"okay\"  Affect:  depressed, flat  Speech:  clear, coherent  Psychomotor Behavior:  no evidence of tardive dyskinesia, dystonia, or tics  Thought Process:  logical, linear, and goal oriented  Associations:  no loose associations  Thought Content:  no evidence of psychotic thought  Insight:  limited  Judgment:  limited  Oriented to:  time, person, and place  Attention Span and Concentration:  intact  Recent and Remote Memory:  intact  Gait and Station: Normal     LABS   Recent Results (from the past 24 hour(s))   Glucose by meter    Collection Time: 02/11/23 11:34 AM   Result Value Ref Range    GLUCOSE BY METER POCT 288 (H) 70 - 99 mg/dL   Glucose by meter    Collection Time: 02/11/23  4:32 PM   Result Value Ref Range    GLUCOSE BY METER POCT 279 (H) 70 - 99 mg/dL   Glucose by meter    Collection Time: 02/11/23  8:34 PM   Result Value Ref Range    GLUCOSE BY METER POCT 268 (H) 70 - 99 mg/dL   Glucose by meter    Collection Time: 02/12/23  2:21 AM   Result Value Ref Range    GLUCOSE BY METER POCT 258 (H) 70 - 99 mg/dL   Glucose by meter    Collection Time: 02/12/23  6:29 AM   Result Value Ref Range    GLUCOSE BY METER POCT 221 (H) 70 - 99 mg/dL         ASSESSMENT   Corie Granda JrBettina is a 27 year old male with a past psychiatric history notable for depression, anxiety. Has had multiple prior inpatient psychiatric hospitalizations. Prior suicide attempt via overdose (x2, 2016, 2021). Presents to outside emergency department with worsening depressive symptoms and increasing intensity and frequency of suicidal ideation with multiple plans in the setting of medication nonadherence and mounting psychosocial stressors. He was placed on an involuntary 72 hour hold. Patient was subsequently transferred and accepted for inpatient psychiatric hospitalization at [facility] for further safety and further stabilization     Daily Progress: remains restricted. Noting improvement in nightmares with prazosin dosing. Encourage behavioral activation. Informed to avoid isolation and make a self-inventory today. suicidality remains.        DIAGNOSTIC FORMULATION   #Major Depressive Disorder, Recurrent, Severe  #PTSD  #Medication Nonadherence  #Type II DM, poor compliance with insulin     PLAN     Location: Unit 5  Legal Status: 72 hour hold  Safety Assessment:    Behavioral Orders   Procedures     Code 1 - Restrict to Unit     Routine Programming     As clinically indicated     Status 15     Every 15 minutes.        PTA medications held:   -none     PTA medications continued/changed:   -lurasidone 60 mg q daily (will titrate up from 20 mg q daily)  -escitalopram 20 mg q daily  -prazosin -> increased to 2 mg (2/10/23)     New medications initiated:   -none    Today's Changes:  - increase evening lantus     Programming: Patient will be treated in a therapeutic milieu with appropriate individual and group therapies. Education will be provided on diagnoses, medications, and treatments. Encouraged behavioral activation and participation in group programming.     Medical diagnoses:  Per medicine    Disposition: pending clinical course       ATTESTATION    Carlos Villar MD  [facility]   Psychiatry    Video Visit: Patient has given verbal consent for video visit?: Yes  Type of Service: video visit for mental health treatment  Reason for Video Visit: COVID-19 [place]  Originating Site (patient location): [facility]  Distant Site (provider location): Remote Location  Mode of Communication: Video Conference via [employer]  Time of Service: Date: 02/12/2023 , Start: 08:00 end: 08:30

## 2023-02-13 LAB
GLUCOSE BLDC GLUCOMTR-MCNC: 159 MG/DL (ref 70–99)
GLUCOSE BLDC GLUCOMTR-MCNC: 183 MG/DL (ref 70–99)
GLUCOSE BLDC GLUCOMTR-MCNC: 206 MG/DL (ref 70–99)
GLUCOSE BLDC GLUCOMTR-MCNC: 211 MG/DL (ref 70–99)
GLUCOSE BLDC GLUCOMTR-MCNC: 234 MG/DL (ref 70–99)
SARS-COV-2 RNA RESP QL NAA+PROBE: NEGATIVE

## 2023-02-13 PROCEDURE — 99232 SBSQ HOSP IP/OBS MODERATE 35: CPT | Mod: GT | Performed by: PSYCHIATRY & NEUROLOGY

## 2023-02-13 PROCEDURE — 250N000013 HC RX MED GY IP 250 OP 250 PS 637: Performed by: NURSE PRACTITIONER

## 2023-02-13 PROCEDURE — 250N000013 HC RX MED GY IP 250 OP 250 PS 637: Performed by: PSYCHIATRY & NEUROLOGY

## 2023-02-13 PROCEDURE — U0003 INFECTIOUS AGENT DETECTION BY NUCLEIC ACID (DNA OR RNA); SEVERE ACUTE RESPIRATORY SYNDROME CORONAVIRUS 2 (SARS-COV-2) (CORONAVIRUS DISEASE [COVID-19]), AMPLIFIED PROBE TECHNIQUE, MAKING USE OF HIGH THROUGHPUT TECHNOLOGIES AS DESCRIBED BY CMS-2020-01-R: HCPCS | Performed by: PSYCHIATRY & NEUROLOGY

## 2023-02-13 PROCEDURE — 124N000001 HC R&B MH

## 2023-02-13 RX ADMIN — TRAZODONE HYDROCHLORIDE 100 MG: 100 TABLET ORAL at 20:14

## 2023-02-13 RX ADMIN — PRAZOSIN HYDROCHLORIDE 2 MG: 1 CAPSULE ORAL at 20:14

## 2023-02-13 RX ADMIN — METFORMIN ER 500 MG 1000 MG: 500 TABLET ORAL at 16:56

## 2023-02-13 RX ADMIN — INSULIN ASPART 1 UNITS: 100 INJECTION, SOLUTION INTRAVENOUS; SUBCUTANEOUS at 16:55

## 2023-02-13 RX ADMIN — ACETAMINOPHEN 650 MG: 325 TABLET, FILM COATED ORAL at 09:10

## 2023-02-13 RX ADMIN — CLONIDINE HYDROCHLORIDE 0.1 MG: 0.1 TABLET ORAL at 09:00

## 2023-02-13 RX ADMIN — INSULIN ASPART 2 UNITS: 100 INJECTION, SOLUTION INTRAVENOUS; SUBCUTANEOUS at 12:40

## 2023-02-13 RX ADMIN — LURASIDONE HYDROCHLORIDE 40 MG: 40 TABLET, FILM COATED ORAL at 16:56

## 2023-02-13 RX ADMIN — METFORMIN ER 500 MG 1000 MG: 500 TABLET ORAL at 09:00

## 2023-02-13 RX ADMIN — ESCITALOPRAM OXALATE 20 MG: 10 TABLET ORAL at 09:00

## 2023-02-13 RX ADMIN — INSULIN ASPART 1 UNITS: 100 INJECTION, SOLUTION INTRAVENOUS; SUBCUTANEOUS at 09:00

## 2023-02-13 ASSESSMENT — ACTIVITIES OF DAILY LIVING (ADL)
ADLS_ACUITY_SCORE: 30
ADLS_ACUITY_SCORE: 30
HYGIENE/GROOMING: INDEPENDENT
ADLS_ACUITY_SCORE: 30
LAUNDRY: UNABLE TO COMPLETE
ADLS_ACUITY_SCORE: 30
DRESS: SCRUBS (BEHAVIORAL HEALTH);INDEPENDENT
ADLS_ACUITY_SCORE: 30
ORAL_HYGIENE: INDEPENDENT
ADLS_ACUITY_SCORE: 30
ADLS_ACUITY_SCORE: 30

## 2023-02-13 NOTE — PROGRESS NOTES
"Received consult for diabetes education.    27 yom admitted with suicidal ideation.  Hx: depression, anxiety, uncontrolled Type 2 diabetes.   Pt reports he was diagnosed \"a couple years ago\".     Ht-70\", Wt-344#.  IBWR is 149-183#.  BMI is 49.    CCHO (60 gram) diet ordered.  Pt is consuming 100% meals and states he feels satisfied with portions being offered.      Current labs note:  A1c 12.2%.  Pt reports lowest A1c has ever been was 8.0% and that was not recently.  Glucose levels trending in the 200's now.      Current diabetes medications:  Metformin XR 1000 mg bid, Lantus 30 units daily,  sliding scale Novolog 4x/day, Ozempic 0.25 mg weekly (just started).   Pt reports he was not taking diabetes medications as prescribed prior to admission.      He listened and participated in discussion today.  He reports he has had diabetes education in the past and has educator available to him.  He just got a new glucose meter 1-2 weeks ago but has not been using it.  He has access to a gym but does not go.    Reviewed basics of healthy food choices and foods high in carbohydrates to limit.  He reports he does not consume sweets or drinks with sugar.  Encouraged exercise for glucose lowering benefit.  Discussed possible use of CGM device for monitoring such as Dexcom G6 or Freestyle Zenaida 2 if insurance will cover cost.   Educated pt on mechanism of action of Ozempic, side effects and dosing.    **Items that would benefit pt most moving forward would be to make sure Ozempic dose is titrated up appropriately as he may not need Novolog if able to tolerate Ozempic.  Pursuing use of CGM device for monitoring since he does not use his glucose meter.    Encouraged pt to let nursing know if he has further questions during stay.        "

## 2023-02-13 NOTE — PROGRESS NOTES
"  [facility] PSYCHIATRY  -  PROGRESS NOTE     ID   Name: Corie Granda Jr.  MRN#: [id number]     SUBJECTIVE   Prior to interviewing the patient, I met with nursing and reviewed patient's clinical condition. We discussed clinical care both before and after the interview. I have reviewed the patient's clinical course by review of records including previous notes, labs, and vital signs.     Per nursing, the patient had the following behavioral events over the last 24-hours: appears depressed. Soft-spoken and withdrawn.      On psychiatric interview, Corie is met within the milieu. He is somber. He denies any complications from re-introducing medications. Still reporting struggling with intrusive thoughts. Feeling he would be better off dead. Has not made contact with family yet since being here. States he will go to groups. Today we spent time talking about future goals and his desire to become an addiction counselor.          MEDICATIONS   Scheduled Meds:    escitalopram  20 mg Oral Daily     insulin aspart  1-7 Units Subcutaneous TID AC     insulin aspart  1-5 Units Subcutaneous At Bedtime     insulin glargine  30 Units Subcutaneous At Bedtime     lurasidone  40 mg Oral Daily with supper    And     [START ON 2/14/2023] lurasidone  60 mg Oral Daily with supper     metFORMIN  1,000 mg Oral BID w/meals     prazosin  2 mg Oral At Bedtime     semaglutide  0.25 mg Subcutaneous Q7 Days     traZODone  100 mg Oral At Bedtime     PRN Meds:.acetaminophen, cloNIDine, glucose **OR** dextrose **OR** glucagon, hydrOXYzine, OLANZapine **OR** OLANZapine     ALLERGIES   No Known Allergies     VITALS   Vitals: /67   Pulse 73   Temp 99.5  F (37.5  C) (Temporal)   Resp 16   Ht 1.778 m (5' 10\")   Wt (!) 156.2 kg (344 lb 4.8 oz)   SpO2 98%   BMI 49.40 kg/m       MENTAL STATUS EXAM   Appearance:  awake, alert, adequately groomed, dressed in hospital scrubs, and appeared as age stated  Attitude:  cooperative  Eye Contact:  " "good  Mood:  \"I guess okay\"  Affect:  depressed, flat  Speech:  clear, coherent  Psychomotor Behavior:  no evidence of tardive dyskinesia, dystonia, or tics  Thought Process:  logical, linear, and goal oriented  Associations:  no loose associations  Thought Content:  no evidence of psychotic thought  Insight:  limited  Judgment:  limited  Oriented to:  time, person, and place  Attention Span and Concentration:  intact  Recent and Remote Memory:  intact  Gait and Station: Normal     LABS   Recent Results (from the past 24 hour(s))   Glucose by meter    Collection Time: 02/12/23  4:57 PM   Result Value Ref Range    GLUCOSE BY METER POCT 209 (H) 70 - 99 mg/dL   Glucose by meter    Collection Time: 02/12/23  7:54 PM   Result Value Ref Range    GLUCOSE BY METER POCT 264 (H) 70 - 99 mg/dL   Glucose by meter    Collection Time: 02/13/23  2:07 AM   Result Value Ref Range    GLUCOSE BY METER POCT 206 (H) 70 - 99 mg/dL   Glucose by meter    Collection Time: 02/13/23  6:51 AM   Result Value Ref Range    GLUCOSE BY METER POCT 183 (H) 70 - 99 mg/dL   Glucose by meter    Collection Time: 02/13/23 11:15 AM   Result Value Ref Range    GLUCOSE BY METER POCT 234 (H) 70 - 99 mg/dL   Asymptomatic COVID-19 Virus (Coronavirus) by PCR Nasopharyngeal    Collection Time: 02/13/23 11:22 AM    Specimen: Nasopharyngeal; Swab   Result Value Ref Range    SARS CoV2 PCR Negative Negative         ASSESSMENT   Corie Granda JrBettina is a 27 year old male with a past psychiatric history notable for depression, anxiety. Has had multiple prior inpatient psychiatric hospitalizations. Prior suicide attempt via overdose (x2, 2016, 2021). Presents to outside emergency department with worsening depressive symptoms and increasing intensity and frequency of suicidal ideation with multiple plans in the setting of medication nonadherence and mounting psychosocial stressors. He was placed on an involuntary 72 hour hold. Patient was subsequently transferred and " accepted for inpatient psychiatric hospitalization at [facility] for further safety and further stabilization     Daily Progress: slightly brighter in conversation but still restricted. Agrees to sign in voluntary. Requesting referrals to IRTS. Encourage group involvement.        DIAGNOSTIC FORMULATION   #Major Depressive Disorder, Recurrent, Severe  #PTSD  #Medication Nonadherence  #Type II DM, poor compliance with insulin     PLAN     Location: Unit [facility]  Legal Status: 72 hour hold _> changed to voluntary status on 2/13/23  Safety Assessment:    Behavioral Orders   Procedures     Code 1 - Restrict to Unit     Routine Programming     As clinically indicated     Status 15     Every 15 minutes.        PTA medications held:   -none     PTA medications continued/changed:   -lurasidone 60 mg q daily (will titrate up from 20 mg q daily)  -escitalopram 20 mg q daily  -prazosin -> increased to 2 mg (2/10/23)     New medications initiated:   -none    Today's Changes:  - encourage PRN usage of clonidine  -will send referrals to IRTS  -sign in to voluntary status    Programming: Patient will be treated in a therapeutic milieu with appropriate individual and group therapies. Education will be provided on diagnoses, medications, and treatments. Encouraged behavioral activation and participation in group programming.     Medical diagnoses:  Per medicine    Disposition: pending clinical course       ATTESTATION    Carlos Villar MD  [facility]   Psychiatry    Video Visit: Patient has given verbal consent for video visit?: Yes  Type of Service: video visit for mental health treatment  Reason for Video Visit: COVID-19 [place]  Originating Site (patient location): [facility]  Distant Site (provider location): Remote Location  Mode of Communication: Video Conference via [employer]  Time of Service: Date: 02/13/2023 , Start: 08:00 end: 08:30

## 2023-02-14 LAB
ANION GAP SERPL CALCULATED.3IONS-SCNC: 10 MMOL/L (ref 7–15)
BUN SERPL-MCNC: 11.9 MG/DL (ref 6–20)
CALCIUM SERPL-MCNC: 8.9 MG/DL (ref 8.6–10)
CHLORIDE SERPL-SCNC: 99 MMOL/L (ref 98–107)
CREAT SERPL-MCNC: 0.61 MG/DL (ref 0.67–1.17)
DEPRECATED HCO3 PLAS-SCNC: 24 MMOL/L (ref 22–29)
GFR SERPL CREATININE-BSD FRML MDRD: >90 ML/MIN/1.73M2
GLUCOSE BLDC GLUCOMTR-MCNC: 150 MG/DL (ref 70–99)
GLUCOSE BLDC GLUCOMTR-MCNC: 163 MG/DL (ref 70–99)
GLUCOSE BLDC GLUCOMTR-MCNC: 180 MG/DL (ref 70–99)
GLUCOSE BLDC GLUCOMTR-MCNC: 182 MG/DL (ref 70–99)
GLUCOSE BLDC GLUCOMTR-MCNC: 275 MG/DL (ref 70–99)
GLUCOSE SERPL-MCNC: 264 MG/DL (ref 70–99)
HOLD SPECIMEN: NORMAL
POTASSIUM SERPL-SCNC: 4.1 MMOL/L (ref 3.4–5.3)
SODIUM SERPL-SCNC: 133 MMOL/L (ref 136–145)

## 2023-02-14 PROCEDURE — 99232 SBSQ HOSP IP/OBS MODERATE 35: CPT | Mod: GT | Performed by: PSYCHIATRY & NEUROLOGY

## 2023-02-14 PROCEDURE — 124N000001 HC R&B MH

## 2023-02-14 PROCEDURE — 82310 ASSAY OF CALCIUM: CPT | Performed by: NURSE PRACTITIONER

## 2023-02-14 PROCEDURE — 99232 SBSQ HOSP IP/OBS MODERATE 35: CPT | Performed by: NURSE PRACTITIONER

## 2023-02-14 PROCEDURE — 36415 COLL VENOUS BLD VENIPUNCTURE: CPT | Performed by: NURSE PRACTITIONER

## 2023-02-14 PROCEDURE — 250N000013 HC RX MED GY IP 250 OP 250 PS 637: Performed by: NURSE PRACTITIONER

## 2023-02-14 PROCEDURE — 250N000013 HC RX MED GY IP 250 OP 250 PS 637: Performed by: PSYCHIATRY & NEUROLOGY

## 2023-02-14 RX ADMIN — ESCITALOPRAM OXALATE 20 MG: 10 TABLET ORAL at 08:20

## 2023-02-14 RX ADMIN — PRAZOSIN HYDROCHLORIDE 2 MG: 1 CAPSULE ORAL at 21:07

## 2023-02-14 RX ADMIN — LURASIDONE HYDROCHLORIDE 60 MG: 60 TABLET, FILM COATED ORAL at 16:51

## 2023-02-14 RX ADMIN — CLONIDINE HYDROCHLORIDE 0.1 MG: 0.1 TABLET ORAL at 08:20

## 2023-02-14 RX ADMIN — TRAZODONE HYDROCHLORIDE 100 MG: 100 TABLET ORAL at 21:07

## 2023-02-14 RX ADMIN — METFORMIN ER 500 MG 1000 MG: 500 TABLET ORAL at 16:51

## 2023-02-14 RX ADMIN — ACETAMINOPHEN 650 MG: 325 TABLET, FILM COATED ORAL at 08:20

## 2023-02-14 RX ADMIN — METFORMIN ER 500 MG 1000 MG: 500 TABLET ORAL at 08:20

## 2023-02-14 RX ADMIN — INSULIN ASPART 3 UNITS: 100 INJECTION, SOLUTION INTRAVENOUS; SUBCUTANEOUS at 12:44

## 2023-02-14 RX ADMIN — INSULIN ASPART 1 UNITS: 100 INJECTION, SOLUTION INTRAVENOUS; SUBCUTANEOUS at 08:21

## 2023-02-14 RX ADMIN — INSULIN ASPART 1 UNITS: 100 INJECTION, SOLUTION INTRAVENOUS; SUBCUTANEOUS at 16:58

## 2023-02-14 ASSESSMENT — ACTIVITIES OF DAILY LIVING (ADL)
ADLS_ACUITY_SCORE: 30

## 2023-02-14 NOTE — PLAN OF CARE
Report received from outgoing staff.    Problem: Adult Behavioral Health Plan of Care  Goal: Patient-Specific Goal (Individualization)  Description: Patient will report at least 6-8 hours of sleep each night.  Patient will complete all ADL's independently while on the unit.   Patient will participate in at least 50% of group programming while on the unit.   Patient will be compliant with treatment team recommendations.  Patient will consume meals provided.   Outcome: Not Progressing     Problem: Suicide Risk  Goal: Absence of Self-Harm  Description: Patient will contract for safety if having thoughts of self harm.   Patient will report absence of suicidal ideations prior to discharge.   Outcome: Not Progressing    Rounds completed. Safety checks completed every 15 minutes throughout the night. Pt noted to be resting with eyes closed and easy resp. for 7 hours. No suicidal gestures or comments noted.    Face to face end of shift report to be communicated to oncoming RN.

## 2023-02-14 NOTE — PROGRESS NOTES
"  [facility] PSYCHIATRY  -  PROGRESS NOTE     ID   Name: Corie Granda Jr.  MRN#: [id number]     SUBJECTIVE   Prior to interviewing the patient, I met with nursing and reviewed patient's clinical condition. We discussed clinical care both before and after the interview. I have reviewed the patient's clinical course by review of records including previous notes, labs, and vital signs.     Per nursing, the patient had the following behavioral events over the last 24-hours: less isolative, more interactive, smiling.     On psychiatric interview, Corie is met within the milieu. He is more upbeat today. He is smiling. He states his gratitude list has been helping him as well as attending groups. He feels better now that there is a plan in place for his stabilization moving forward - he plans to attend an IRTS - he is aware of waitlist to get in. He denies SI, no plan, no intent. He plans to get in contact with family today, plans to stay with mother and sister. Wants to consider discharge as early as tomorrow. Will work on crisis action plan.           MEDICATIONS   Scheduled Meds:    escitalopram  20 mg Oral Daily     insulin aspart  1-7 Units Subcutaneous TID AC     insulin aspart  1-5 Units Subcutaneous At Bedtime     insulin glargine  30 Units Subcutaneous At Bedtime     lurasidone  60 mg Oral Daily with supper     metFORMIN  1,000 mg Oral BID w/meals     prazosin  2 mg Oral At Bedtime     semaglutide  0.25 mg Subcutaneous Q7 Days     traZODone  100 mg Oral At Bedtime     PRN Meds:.acetaminophen, cloNIDine, glucose **OR** dextrose **OR** glucagon, hydrOXYzine, OLANZapine **OR** OLANZapine     ALLERGIES   No Known Allergies     VITALS   Vitals: /74   Pulse 84   Temp 98  F (36.7  C) (Temporal)   Resp 16   Ht 1.778 m (5' 10\")   Wt (!) 156.2 kg (344 lb 4.8 oz)   SpO2 95%   BMI 49.40 kg/m       MENTAL STATUS EXAM   Appearance:  awake, alert, adequately groomed, dressed in hospital scrubs, and appeared as " "age stated  Attitude:  cooperative  Eye Contact:  good  Mood: \"good\"  Affect:  euthymic, smiles  Speech:  clear, coherent  Psychomotor Behavior:  no evidence of tardive dyskinesia, dystonia, or tics  Thought Process:  logical, linear, and goal oriented  Associations:  no loose associations  Thought Content:  no evidence of psychotic thought  Insight: improved  Judgment: improved  Oriented to:  time, person, and place  Attention Span and Concentration:  intact  Recent and Remote Memory:  intact  Gait and Station: Normal     LABS   Recent Results (from the past 24 hour(s))   Glucose by meter    Collection Time: 02/13/23  4:48 PM   Result Value Ref Range    GLUCOSE BY METER POCT 159 (H) 70 - 99 mg/dL   Glucose by meter    Collection Time: 02/13/23  8:02 PM   Result Value Ref Range    GLUCOSE BY METER POCT 211 (H) 70 - 99 mg/dL   Glucose by meter    Collection Time: 02/14/23  2:58 AM   Result Value Ref Range    GLUCOSE BY METER POCT 180 (H) 70 - 99 mg/dL   Glucose by meter    Collection Time: 02/14/23  7:09 AM   Result Value Ref Range    GLUCOSE BY METER POCT 182 (H) 70 - 99 mg/dL   Glucose by meter    Collection Time: 02/14/23 11:18 AM   Result Value Ref Range    GLUCOSE BY METER POCT 275 (H) 70 - 99 mg/dL   Basic metabolic panel    Collection Time: 02/14/23 11:35 AM   Result Value Ref Range    Sodium 133 (L) 136 - 145 mmol/L    Potassium 4.1 3.4 - 5.3 mmol/L    Chloride 99 98 - 107 mmol/L    Carbon Dioxide (CO2) 24 22 - 29 mmol/L    Anion Gap 10 7 - 15 mmol/L    Urea Nitrogen 11.9 6.0 - 20.0 mg/dL    Creatinine 0.61 (L) 0.67 - 1.17 mg/dL    Calcium 8.9 8.6 - 10.0 mg/dL    Glucose 264 (H) 70 - 99 mg/dL    GFR Estimate >90 >60 mL/min/1.73m2   Extra Purple Top Tube    Collection Time: 02/14/23 11:35 AM   Result Value Ref Range    Hold Specimen JIC          ASSESSMENT   Corie Granda  is a 27 year old male with a past psychiatric history notable for depression, anxiety. Has had multiple prior inpatient psychiatric " hospitalizations. Prior suicide attempt via overdose (x2, 2016, 2021). Presents to outside emergency department with worsening depressive symptoms and increasing intensity and frequency of suicidal ideation with multiple plans in the setting of medication nonadherence and mounting psychosocial stressors. He was placed on an involuntary 72 hour hold. Patient was subsequently transferred and accepted for inpatient psychiatric hospitalization at [facility] for further safety and further stabilization     Daily Progress: euthymic today. He is smiling. He is aware of wait list at [facility]. He believes he will be able to transition home prior to enrollment. He denies SI, no plan, no intent. Anticipate dismissal as early as tomorrow.        DIAGNOSTIC FORMULATION   #Major Depressive Disorder, Recurrent, Severe  #PTSD  #Medication Nonadherence  #Type II DM, poor compliance with insulin     PLAN     Location: Unit [facility]  Legal Status: 72 hour hold _> changed to voluntary status on 2/13/23  Safety Assessment:    Behavioral Orders   Procedures     Code 1 - Restrict to Unit     Routine Programming     As clinically indicated     Status 15     Every 15 minutes.        PTA medications held:   -none     PTA medications continued/changed:   -lurasidone 60 mg q daily (will titrate up from 20 mg q daily)  -escitalopram 20 mg q daily  -prazosin -> increased to 2 mg (2/10/23)     New medications initiated:   -none    Today's Changes:  - encourage PRN usage of clonidine  -will send referrals to IRTS  -sign in to voluntary status    Programming: Patient will be treated in a therapeutic milieu with appropriate individual and group therapies. Education will be provided on diagnoses, medications, and treatments. Encouraged behavioral activation and participation in group programming.     Medical diagnoses:  Per medicine    Disposition: pending clinical course       ATTESTATION    Carlos Villar MD  [place]  [place]   Psychiatry    Video Visit: Patient has given verbal consent for video visit?: Yes  Type of Service: video visit for mental health treatment  Reason for Video Visit: COVID-19 [place]  Originating Site (patient location): [facility]  Distant Site (provider location): Remote Location  Mode of Communication: Video Conference via [employer]  Time of Service: Date: 02/14/2023 , Start: 08:00 end: 08:30

## 2023-02-14 NOTE — PROGRESS NOTES
[facility] called and stated they are about 2 weeks out for placement and have the pt on their list. They state he looks like a good fit.

## 2023-02-14 NOTE — PLAN OF CARE
Problem: Adult Behavioral Health Plan of Care  Goal: Patient-Specific Goal (Individualization)  Description: Patient will report at least 6-8 hours of sleep each night.  Patient will complete all ADL's independently while on the unit.   Patient will participate in at least 50% of group programming while on the unit.   Patient will be compliant with treatment team recommendations.  Patient will consume meals provided.   Outcome: Progressing    Pt in bed most of the shift. Pt denies pain, SI, HI and hallucinations. Pt states that his depression is somewhat better today and agreed to keep a gratitude list to help with staying positive. Pt denies anxiety. Pt accuchek at dinner time was 159 and 211 at snack time. Pt woken up for snacks and encouraged to go to group and get out of bed. Pt at 1/2 of group at the start of the shift but declined group after dinner.        Problem: Suicide Risk  Goal: Absence of Self-Harm  Description: Patient will contract for safety if having thoughts of self harm.   Patient will report absence of suicidal ideations prior to discharge.   Outcome: Progressing   Goal Outcome Evaluation:    Plan of Care Reviewed With: patient        Face to face end of shift report communicated to night shift RN. Reported that pt is a risk for suicide.     Karlie Batista, RN  2/13/2023  9:32 PM

## 2023-02-14 NOTE — PLAN OF CARE
Problem: Adult Behavioral Health Plan of Care  Goal: Patient-Specific Goal (Individualization)  Description: Patient will report at least 6-8 hours of sleep each night.  Patient will complete all ADL's independently while on the unit.   Patient will participate in at least 50% of group programming while on the unit.   Patient will be compliant with treatment team recommendations.  Patient will consume meals provided.   Outcome: Progressing  Pt in bed when nursing did nursing assessment. Pt states he is leaving tomorrow, stated he thinks the IRTS will be good for him. Pt will stay with his sister which he says is a safe place for him. Pt denies symptoms of pain, anxiety, SI, HI and hallucinations. Pt does report depression but feels it is much better, rates depression aat 4/10. Pt more open and social this shift. 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 Pt blood sugar at dinner time was  150 requiring 1 unit of Novolog and 163 at snack time requiring no Novolog. Pt attended group this evening. Pt made a gratitude list in his journal this morning and plans to continue doing so.       Problem: Suicide Risk  Goal: Absence of Self-Harm  Description: Patient will contract for safety if having thoughts of self harm.   Patient will report absence of suicidal ideations prior to discharge.   Outcome: Progressing   Goal Outcome Evaluation:    Plan of Care Reviewed With: patient          Face to face end of shift report communicated to night shift RN. Reported that pt is a risk for suicide.     Karlie Batista, RN  2/14/2023  3:50 PM

## 2023-02-14 NOTE — PROGRESS NOTES
[facility]    Medical Services Progress Note    Date of Service (when I saw the patient): 02/14/2023    Assessment & Plan      Active Medical  Problems:    Uncontrolled type 2 diabetes mellitus with hyperglycemia (H)- noncompliant with his diabetes medications. He states he takes them sometimes and seldom uses his injectables. Last A1c 11/2/21 12.9. A1c today 12.2. He reports he was recently started on ozempic and has taken one dose last Thursday. He is also reports being prescribed Lantus 20 units. He states he takes his metformin daily, but may miss a dose occasionally.  Blood sugars trending down. 248, 260, 299 today. In the 300's and up to 403 yesterday. Consistent carb diet ordered, accuchecks qid, bedtime, and 0200 ordered with sliding scale insulin. Will see if the Diabetic educator can see the pt. Home meds- Ozempic ordered, Pt is also prescribed Novolog 20 units with meals, but has not been taking any of his insulin so will hold off on this for now and start him back slowly on his insulin regimen. Will start back with the Lantus 20 units at bedtime. Novolog with meals can be added later if needed. He does have sliding scale coverage. And Metformin is ordered.   2/14- compliant with accuchecks and insulin, metformin, ozempic. Denies any complaints. Blood sugars are improving. Averaging in the 180's to low 200's. Lantus was increased over the weekend to 30 units. Will keep at 30 units for now. Will continue to monitor.      Mild Hyponatremia- Na in the . Na today 135.  Stable. Reports a headache but states he gets headaches a lot when stressed. Notified nurse pt is requesting tylenol. Denies dizziness, nausea, vomiting. Nursing to continue to monitor. Instructed pt to report new or worsening symptoms to nurse. Pt verbalized understanding.    2/14- asymptomatic. Bmp today.       Code Status: Full Code.    Nusrat Eid CNP      -Data reviewed today: I reviewed all new labs and imaging results  over the last 24 hours.     Physical Exam   Temp: 98  F (36.7  C) Temp src: Temporal BP: 141/74 Pulse: 84   Resp: 16 SpO2: 95 % O2 Device: None (Room air)    Vitals:    02/11/23 1646 02/12/23 0900   Weight: (!) 156.8 kg (345 lb 11.2 oz) (!) 156.2 kg (344 lb 4.8 oz)     Vital Signs with Ranges  Temp:  [97.6  F (36.4  C)-99.5  F (37.5  C)] 98  F (36.7  C)  Pulse:  [73-84] 84  Resp:  [16] 16  BP: (119-141)/(63-81) 141/74  SpO2:  [95 %-98 %] 95 %  No intake/output data recorded.    Constitutional: awake, alert, cooperative, no apparent distress, and appears stated age, vitals stable   Respiratory: No increased work of breathing, good air exchange, clear to auscultation bilaterally, no crackles or wheezing  Cardiovascular: Normal apical impulse, regular rate and rhythm, normal S1 and S2, no S3 or S4, and no murmur noted  Neurologic: Awake, alert, oriented to name, place and time.    Neuropsychiatric: General: depressed, guarded, calm and fair eye contact    Medications     escitalopram  20 mg Oral Daily     insulin aspart  1-7 Units Subcutaneous TID AC     insulin aspart  1-5 Units Subcutaneous At Bedtime     insulin glargine  30 Units Subcutaneous At Bedtime     lurasidone  60 mg Oral Daily with supper     metFORMIN  1,000 mg Oral BID w/meals     prazosin  2 mg Oral At Bedtime     semaglutide  0.25 mg Subcutaneous Q7 Days     traZODone  100 mg Oral At Bedtime       Data   Recent Labs   Lab 02/14/23  0709 02/14/23  0258 02/13/23  2002 02/10/23  1120 02/10/23  1003   NA  --   --   --   --  135*   POTASSIUM  --   --   --   --  4.4   CHLORIDE  --   --   --   --  100   CO2  --   --   --   --  22   BUN  --   --   --   --  15.3   CR  --   --   --   --  0.61*   ANIONGAP  --   --   --   --  13   CONSTANTINO  --   --   --   --  8.9   * 180* 211*   < > 347*    < > = values in this interval not displayed.       No results found for this or any previous visit (from the past 24 hour(s)).

## 2023-02-14 NOTE — PLAN OF CARE
"  Problem: Adult Behavioral Health Plan of Care  Goal: Patient-Specific Goal (Individualization)  Description: Patient will report at least 6-8 hours of sleep each night.  Patient will complete all ADL's independently while on the unit.   Patient will participate in at least 50% of group programming while on the unit.   Patient will be compliant with treatment team recommendations.  Patient will consume meals provided.   Outcome: Progressing  Note: 07:35: Received end of shift report from MASOUD Castellon. Pt displaying s/s of sleep upon arrival---    08:20: Out for breakfast, brighter affect, endorses increasing anxiety, PRN clonidine given; PRN acetaminophen given for \"6\"/10 headache-- 1 unit sliding scale novolog coverage given, accucheck of 182.     Voluntary rights signed, copy given to patient, original placed in chart under legal documents    09:35: Pt actively participating in group @ present---     11:30: Accucheck 275; post pieces of candy; 3 units sliding scale coverage to be given    12:45: notified this writer that sister will be able to pick him up tomorrow for discharge--will need to send sister money for gas via [employer]--- will update PCP     Problem: Suicide Risk  Goal: Absence of Self-Harm  Description: Patient will contract for safety if having thoughts of self harm.   Patient will report absence of suicidal ideations prior to discharge.   Outcome: Progressing   Goal Outcome Evaluation:                        "

## 2023-02-15 VITALS
RESPIRATION RATE: 16 BRPM | HEIGHT: 70 IN | WEIGHT: 315 LBS | BODY MASS INDEX: 45.1 KG/M2 | SYSTOLIC BLOOD PRESSURE: 140 MMHG | HEART RATE: 73 BPM | OXYGEN SATURATION: 97 % | TEMPERATURE: 98.6 F | DIASTOLIC BLOOD PRESSURE: 78 MMHG

## 2023-02-15 LAB — GLUCOSE BLDC GLUCOMTR-MCNC: 190 MG/DL (ref 70–99)

## 2023-02-15 PROCEDURE — 250N000013 HC RX MED GY IP 250 OP 250 PS 637: Performed by: PSYCHIATRY & NEUROLOGY

## 2023-02-15 PROCEDURE — 99239 HOSP IP/OBS DSCHRG MGMT >30: CPT | Mod: GT | Performed by: PSYCHIATRY & NEUROLOGY

## 2023-02-15 RX ORDER — PRAZOSIN HYDROCHLORIDE 2 MG/1
2 CAPSULE ORAL AT BEDTIME
Qty: 30 CAPSULE | Refills: 0 | Status: SHIPPED | OUTPATIENT
Start: 2023-02-15 | End: 2023-03-17

## 2023-02-15 RX ORDER — INSULIN GLARGINE 100 [IU]/ML
30 INJECTION, SOLUTION SUBCUTANEOUS AT BEDTIME
Qty: 15 ML | Refills: 0 | Status: SHIPPED | OUTPATIENT
Start: 2023-02-15

## 2023-02-15 RX ORDER — METFORMIN HYDROCHLORIDE EXTENDED-RELEASE TABLETS 1000 MG/1
1000 TABLET, FILM COATED, EXTENDED RELEASE ORAL 2 TIMES DAILY WITH MEALS
Qty: 60 TABLET | Refills: 0 | Status: SHIPPED | OUTPATIENT
Start: 2023-02-15 | End: 2023-03-17

## 2023-02-15 RX ORDER — ESCITALOPRAM OXALATE 20 MG/1
20 TABLET ORAL DAILY
Qty: 30 TABLET | Refills: 0 | Status: SHIPPED | OUTPATIENT
Start: 2023-02-15 | End: 2023-03-17

## 2023-02-15 RX ORDER — LURASIDONE HYDROCHLORIDE 60 MG/1
60 TABLET, FILM COATED ORAL
Qty: 30 TABLET | Refills: 0 | Status: SHIPPED | OUTPATIENT
Start: 2023-02-15 | End: 2023-03-17

## 2023-02-15 RX ORDER — TRAZODONE HYDROCHLORIDE 100 MG/1
100 TABLET ORAL AT BEDTIME
Qty: 30 TABLET | Refills: 0 | Status: SHIPPED | OUTPATIENT
Start: 2023-02-15 | End: 2023-03-17

## 2023-02-15 RX ORDER — SEMAGLUTIDE 1.34 MG/ML
0.25 INJECTION, SOLUTION SUBCUTANEOUS
Qty: 1.5 ML | Refills: 0 | Status: SHIPPED | OUTPATIENT
Start: 2023-02-17

## 2023-02-15 RX ORDER — CLONIDINE HYDROCHLORIDE 0.1 MG/1
0.1 TABLET ORAL 3 TIMES DAILY PRN
Qty: 90 TABLET | Refills: 0 | Status: SHIPPED | OUTPATIENT
Start: 2023-02-15 | End: 2023-03-17

## 2023-02-15 RX ADMIN — ESCITALOPRAM OXALATE 20 MG: 10 TABLET ORAL at 09:00

## 2023-02-15 RX ADMIN — METFORMIN ER 500 MG 1000 MG: 500 TABLET ORAL at 09:00

## 2023-02-15 RX ADMIN — INSULIN ASPART 2 UNITS: 100 INJECTION, SOLUTION INTRAVENOUS; SUBCUTANEOUS at 08:59

## 2023-02-15 ASSESSMENT — ACTIVITIES OF DAILY LIVING (ADL)
LAUNDRY: UNABLE TO COMPLETE
ADLS_ACUITY_SCORE: 30
ADLS_ACUITY_SCORE: 30
HYGIENE/GROOMING: INDEPENDENT
ADLS_ACUITY_SCORE: 30
DRESS: SCRUBS (BEHAVIORAL HEALTH);INDEPENDENT
ADLS_ACUITY_SCORE: 30
ADLS_ACUITY_SCORE: 30
ORAL_HYGIENE: INDEPENDENT
ADLS_ACUITY_SCORE: 30
ADLS_ACUITY_SCORE: 30

## 2023-02-15 NOTE — PLAN OF CARE
Pt is discharging at the recommendation of the treatment team. Pt is discharging to home transported by sister. Pt denies having any thoughts of hurting themself or anyone else. Pt denies anxiety or depression. Pt has follow up with psychaitry. Discharge instructions, including; demographic sheet, psychiatric evaluation, discharge summary, and AVS were faxed to these next level of care providers.

## 2023-02-15 NOTE — PLAN OF CARE
Face to face shift report received from nurse. Rounding completed, pt observed.    Problem: Adult Behavioral Health Plan of Care  Goal: Plan of Care Review  Outcome: Adequate for Care Transition  Flowsheets (Taken 2/15/2023 0900)  Patient Agreement with Plan of Care: agrees     Problem: Adult Behavioral Health Plan of Care  Goal: Patient-Specific Goal (Individualization)  Description: Patient will report at least 6-8 hours of sleep each night.  Patient will complete all ADL's independently while on the unit.   Patient will participate in at least 50% of group programming while on the unit.   Patient will be compliant with treatment team recommendations.  Patient will consume meals provided.   Outcome: Adequate for Care Transition     Problem: Adult Behavioral Health Plan of Care  Goal: Individualized Daily Interaction Plan (IDIP)  Outcome: Adequate for Care Transition     Problem: Adult Behavioral Health Plan of Care  Goal: Adheres to Safety Considerations for Self and Others  Outcome: Adequate for Care Transition     Problem: Adult Behavioral Health Plan of Care  Goal: Absence of New-Onset Illness or Injury  Outcome: Adequate for Care Transition     Problem: Adult Behavioral Health Plan of Care  Goal: Optimized Coping Skills in Response to Life Stressors  Outcome: Adequate for Care Transition     Problem: Adult Behavioral Health Plan of Care  Goal: Develops/Participates in Therapeutic Carrie to Support Successful Transition  Outcome: Adequate for Care Transition     Problem: Suicide Risk  Goal: Absence of Self-Harm  Description: Patient will contract for safety if having thoughts of self harm.   Patient will report absence of suicidal ideations prior to discharge.   Outcome: Adequate for Care Transition     Discharge Note    Patient Discharged to home on 2/15/2023 1311 PM via Private Car accompanied by Sister and grandma.     Patient informed of discharge instructions in AVS. patient verbalizes understanding and  denies having any questions pertaining to AVS. Patient stable at time of discharge. Patient denies SI, HI, and thoughts of self harm at time of discharge. All personal belongings returned to patient. Discharge prescriptions sent to [facility] in [place] via electronic communication.     Regino Abbott RN  2/15/2023  1:31 PM

## 2023-02-15 NOTE — PLAN OF CARE
Report received from outgoing staff.    Problem: Adult Behavioral Health Plan of Care  Goal: Patient-Specific Goal (Individualization)  Description: Patient will report at least 6-8 hours of sleep each night.  Patient will complete all ADL's independently while on the unit.   Patient will participate in at least 50% of group programming while on the unit.   Patient will be compliant with treatment team recommendations.  Patient will consume meals provided.   Outcome: Not Progressing     Problem: Suicide Risk  Goal: Absence of Self-Harm  Description: Patient will contract for safety if having thoughts of self harm.   Patient will report absence of suicidal ideations prior to discharge.   Outcome: Not Progressing    Rounds completed. Safety checks completed every 15 minutes throughout the night. Pt noted to be resting with eyes closed and easy resp. for 5 hours. No suicidal gestures or comments noted.    Face to face end of shift report to be communicated to oncoming RN.

## 2023-02-16 NOTE — DISCHARGE SUMMARY
[facility]  DISCHARGE SUMMARY     DISCHARGE DATA     Corie Granda Jr. MRN# [id number]   Age: 27 year old YOB: 1995     Date of Admission: 2/9/2023  Date of Discharge: February 15, 2023  Discharge Provider: Carlos Villar MD       REASON FOR ADMISSION   Corie Granda Jr. is a 27 year old male with a past psychiatric history notable for depression, anxiety. Has had multiple prior inpatient psychiatric hospitalizations. Prior suicide attempt via overdose (x2, 2016, 2021). Presents to outside emergency department with worsening depressive symptoms and increasing intensity and frequency of suicidal ideation with multiple plans in the setting of medication nonadherence and mounting psychosocial stressors. He was placed on an involuntary 72 hour hold. Patient was subsequently transferred and accepted for inpatient psychiatric hospitalization at [facility] for further safety and further stabilization        DISCHARGE DIAGNOSES   #Major Depressive Disorder, Recurrent, Severe  #PTSD  #Medication Nonadherence  #Type II DM, poor compliance with insulin     HOSPITAL COURSE   Patient was admitted to unit 5 due to the aforementioned presentation. The patient was placed under 15 minute checks to ensure patient safety. The patient participated in unit programming and groups as able.    Legal status during hospitalization was involuntary .Mr. Granda did not require seclusion/restraint during hospitalization.  Corie signed in voluntary and remained in the hospital on a voluntary basis for stabilization.  He expressed interest in pursuing an IRTS program in the outpatient setting for further stabilization. The waitlist was 2-3 weeks for an open bed and he expressed a desire to discharge to his sisters residence prior to attending an IRTS. He expressed an ability to follow-up with the IRTS and coordinate an admission date. He did not wish to stay in [facility]  while he transitioned to an IRTS (an option that was offered to him).     We reviewed with Mr. Granda current and past medication trials including duration, dose, response and side effects. During this hospitalization, the following changes to the patient's psychotropic medications were made:    PTA medications held:   -none     PTA medications continued/changed:   -lurasidone 60 mg q daily (will titrate up from 20 mg q daily)  -escitalopram 20 mg q daily  -prazosin -> increased to 2 mg (2/10/23)     New medications initiated:   -none    He was re-started on lurasidone and escitalopram which he was not taking regularly on outpatient basis.  We increased his prazosin from 1 mg to 2 mg and he reported resolution of nightmares.     Mr. Granda progressed gradually related to response to medication changes, confidence in skills to manage symptoms and establishment of a supportive community network . By the time of discharge, his symptoms had improved significantly.  Depression improved with increased confidence in ability to manage symptoms., Anxiety improved with increased confidence in ability to manage symptoms. and Suicidal ideation resolved with adequate outpatient plan in place.  The treatment goals (long-term goal/discharge criteria) were reached.     With the aforementioned changes and supports the patient noticed improvement in their symptoms and felt sufficiently ready for discharge. As a result, Corie Granda Jr. was discharged. At the time of discharge, Corie Granda Jr. was determined to not be a danger to self or others. The patient was also medically stable for discharge. At the current time of discharge, the patient does not meet criteria for involuntary hospitalization. On the day of discharge, the patient reports that they do not have suicidal or homicidal ideation. Steps taken to minimize risk include: assessing patient s behavior and thought process daily during hospital stay, discharging patient  with adequate plan for follow up for mental and physical health and discussing safety plan of returning to the hospital should the patient ever have thoughts of harming themselves or others. Therefore, based on all available evidence including the factors cited above, the patient does not appear to be at imminent risk for self-harm, and is appropriate for outpatient level of care. The acute crisis is resolved and Corie is discharging in improved condition. Though Corie's acute crisis has resolved, there remains a higher risk of suicide over the long term compared to the general population. Factors that may be associated with relapse include worsening of depressive symptoms, alcohol use, illicit substance use, not taking medications, lack of mental health follow-up appointments and work/family/relationship stressors. Corie Granda Jr. has a plan in place to mitigate these stressors, is hopeful about the future ,and is not assessed to be a imminent risk to self or anyone else and thus is not assessed to be holdable.  Corie has received maximal benefit from the current hospital stay. Corie Granda Jr. set to discharge.        DISCHARGE MEDICATIONS     Discharge Medication List as of 2/15/2023  9:51 AM      START taking these medications    Details   lurasidone (LATUDA) 60 MG TABS tablet Take 1 tablet (60 mg) by mouth daily (with dinner) for 30 days, Disp-30 tablet, R-0, E-Prescribe         CONTINUE these medications which have CHANGED    Details   cloNIDine (CATAPRES) 0.1 MG tablet Take 1 tablet (0.1 mg) by mouth 3 times daily as needed (Anxiety), Disp-90 tablet, R-0, E-Prescribe      escitalopram (LEXAPRO) 20 MG tablet Take 1 tablet (20 mg) by mouth daily for 30 days, Disp-30 tablet, R-0, E-Prescribe      insulin glargine (LANTUS SOLOSTAR) 100 UNIT/ML pen Inject 30 Units Subcutaneous At Bedtime, Disp-15 mL, R-0, E-PrescribeIf Lantus is not covered by insurance, may substitute Basaglar or Semglee or other insulin  "glargine product per insurance preference at same dose and frequency.        metFORMIN (FORTAMET) 1000 MG 24 hr tablet Take 1 tablet (1,000 mg) by mouth 2 times daily (with meals) for 30 days, Disp-60 tablet, R-0, E-Prescribe      prazosin (MINIPRESS) 2 MG capsule Take 1 capsule (2 mg) by mouth At Bedtime for 30 days, Disp-30 capsule, R-0, E-Prescribe      semaglutide (OZEMPIC, 0.25 OR 0.5 MG/DOSE,) 2 MG/1.5ML SOPN pen Inject 0.25 mg Subcutaneous every 7 days, Disp-1.5 mL, R-0, E-Prescribe      traZODone (DESYREL) 100 MG tablet Take 1 tablet (100 mg) by mouth At Bedtime for 30 days, Disp-30 tablet, R-0, E-Prescribe         CONTINUE these medications which have NOT CHANGED    Details   acetaminophen (TYLENOL) 500 MG tablet Take 1,000 mg by mouth 2 times daily as needed (headaches), Historical      fish oil-omega-3 fatty acids 1000 MG capsule Take 1 g by mouth daily, Historical      Glucose Blood (FREESTYLE LITE TEST VI) Use one test strip to monitor blood glucose levels four times daily or as directed., Historical         STOP taking these medications       metFORMIN (GLUCOPHAGE) 500 MG tablet Comments:   Reason for Stopping:         NOVOLOG FLEXPEN 100 UNIT/ML soln Comments:   Reason for Stopping:                VITALS   Vitals: /78   Pulse 73   Temp 98.6  F (37  C) (Temporal)   Resp 16   Ht 1.778 m (5' 10\")   Wt (!) 156.2 kg (344 lb 4.8 oz)   SpO2 97%   BMI 49.40 kg/m       MENTAL STATUS EXAM   Appearance: Alert, oriented, dressed in hospital scrubs, appears stated age   Attitude: Cooperative   Eye Contact: Good  Mood: \"Better\"  Affect: Full range of affect  Speech: Normal rate and rhythm   Psychomotor Behavior: No tremor, rigidity, or psychomotor abnormality   Thought Process: Logical, goal directed   Associations: No loose associations   Thought Content: Denies SI or plan. No SIB. Denies A/V hallucinations. No evidence of delusional thought.  Insight: Good  Judgment: Good  Oriented to: Person, place, " and time  Attention Span and Concentration: Intact  Recent and Remote Memory: Intact  Language: English with appropriate syntax and vocabulary  Fund of Knowledge: Average  Muscle Strength and Tone: Grossly normal  Gait and Station: Grossly normal       DISCHARGE PLAN     1.  Education given regarding diagnostic and treatment options with risks, benefits and alternatives with adequate verbalization of understanding.  2.  Discharge to home. Upon detailed review of risk factors, patient amenable for release.   3.  Continue aforementioned medications and associated medication changes with follow-up by outpatient provider.  4.  Crisis management planning in place.    5.  Nursing and  to review further discharge recommendations.   6.  Active issues: please follow-up with primary care provider for continued adjustments with your insulin regimen and hemoglobin A1c  7.  Patient is being discharged with the following appointments as detailed below:    See AVS       DISCHARGE SERVICES PROVIDED     80 minutes spent on discharge services, including:  Final examination of patient.  Review and discussion of hospital stay.  Instructions for continued outpatient care/goals.  Preparation of discharge records.  Preparation of medications refills and new prescriptions.  Preparation of applicable referral forms.      ATTESTATION   Carlos Villar MD  [facility]   Psychiatry    Video Visit: Patient has given verbal consent for video visit?: Yes  Type of Service: video visit for mental health treatment  Reason for Video Visit: COVID-[place]  Originating Site (patient location): [facility]  Distant Site (provider location): Remote Location  Mode of Communication: Video Conference via [employer]  Time of Service: Date: February 15, 2023 , Start: 08:00,  Stop: 09:00     LABS THIS ADMISSION     Results for orders placed or performed during the hospital encounter of 02/09/23   Glucose by  meter     Status: Abnormal   Result Value Ref Range    GLUCOSE BY METER POCT 250 (H) 70 - 99 mg/dL   Glucose by meter     Status: Abnormal   Result Value Ref Range    GLUCOSE BY METER POCT 403 (H) 70 - 99 mg/dL   Glucose by meter     Status: Abnormal   Result Value Ref Range    GLUCOSE BY METER POCT 341 (H) 70 - 99 mg/dL   Glucose by meter     Status: Abnormal   Result Value Ref Range    GLUCOSE BY METER POCT 312 (H) 70 - 99 mg/dL   Glucose by meter     Status: Abnormal   Result Value Ref Range    GLUCOSE BY METER POCT 260 (H) 70 - 99 mg/dL   Glucose by meter     Status: Abnormal   Result Value Ref Range    GLUCOSE BY METER POCT 248 (H) 70 - 99 mg/dL   Hemoglobin A1c     Status: Abnormal   Result Value Ref Range    Estimated Average Glucose 303 mg/dL    Hemoglobin A1C 12.2 (H) <5.7 %   Basic metabolic panel     Status: Abnormal   Result Value Ref Range    Sodium 135 (L) 136 - 145 mmol/L    Potassium 4.4 3.4 - 5.3 mmol/L    Chloride 100 98 - 107 mmol/L    Carbon Dioxide (CO2) 22 22 - 29 mmol/L    Anion Gap 13 7 - 15 mmol/L    Urea Nitrogen 15.3 6.0 - 20.0 mg/dL    Creatinine 0.61 (L) 0.67 - 1.17 mg/dL    Calcium 8.9 8.6 - 10.0 mg/dL    Glucose 347 (H) 70 - 99 mg/dL    GFR Estimate >90 >60 mL/min/1.73m2   Extra Tube     Status: None    Narrative    The following orders were created for panel order Extra Tube.  Procedure                               Abnormality         Status                     ---------                               -----------         ------                     Extra Red Top Tube[[id number]]                               Final result                 Please view results for these tests on the individual orders.   Extra Red Top Tube     Status: None   Result Value Ref Range    Hold Specimen JI    Glucose by meter     Status: Abnormal   Result Value Ref Range    GLUCOSE BY METER POCT 299 (H) 70 - 99 mg/dL   Glucose by meter     Status: Abnormal   Result Value Ref Range    GLUCOSE BY METER POCT 241 (H)  70 - 99 mg/dL   Glucose by meter     Status: Abnormal   Result Value Ref Range    GLUCOSE BY METER POCT 284 (H) 70 - 99 mg/dL   Glucose by meter     Status: Abnormal   Result Value Ref Range    GLUCOSE BY METER POCT 265 (H) 70 - 99 mg/dL   Glucose by meter     Status: Abnormal   Result Value Ref Range    GLUCOSE BY METER POCT 244 (H) 70 - 99 mg/dL   Glucose by meter     Status: Abnormal   Result Value Ref Range    GLUCOSE BY METER POCT 288 (H) 70 - 99 mg/dL   Glucose by meter     Status: Abnormal   Result Value Ref Range    GLUCOSE BY METER POCT 279 (H) 70 - 99 mg/dL   Glucose by meter     Status: Abnormal   Result Value Ref Range    GLUCOSE BY METER POCT 268 (H) 70 - 99 mg/dL   Glucose by meter     Status: Abnormal   Result Value Ref Range    GLUCOSE BY METER POCT 258 (H) 70 - 99 mg/dL   Glucose by meter     Status: Abnormal   Result Value Ref Range    GLUCOSE BY METER POCT 221 (H) 70 - 99 mg/dL   Glucose by meter     Status: Abnormal   Result Value Ref Range    GLUCOSE BY METER POCT 244 (H) 70 - 99 mg/dL   Glucose by meter     Status: Abnormal   Result Value Ref Range    GLUCOSE BY METER POCT 209 (H) 70 - 99 mg/dL   Glucose by meter     Status: Abnormal   Result Value Ref Range    GLUCOSE BY METER POCT 264 (H) 70 - 99 mg/dL   Glucose by meter     Status: Abnormal   Result Value Ref Range    GLUCOSE BY METER POCT 206 (H) 70 - 99 mg/dL   Glucose by meter     Status: Abnormal   Result Value Ref Range    GLUCOSE BY METER POCT 183 (H) 70 - 99 mg/dL   Asymptomatic COVID-19 Virus (Coronavirus) by PCR Nasopharyngeal     Status: Normal    Specimen: Nasopharyngeal; Swab   Result Value Ref Range    SARS CoV2 PCR Negative Negative    Narrative    Testing was performed using the Xpert Xpress SARS-CoV-2 Assay on the Cepheid Gene-Xpert Instrument Systems. Additional information about this Emergency Use Authorization (EUA) assay can be found via the Lab Guide. This test should be ordered for the detection of SARS-CoV-2 in  individuals who meet SARS-CoV-2 clinical and/or epidemiological criteria as well as from individuals without symptoms or other reasons to suspect COVID-19. Test performance for asymptomatic patients has only been established in anterior nasal swab specimens. This test is for in vitro diagnostic use under the FDA EUA for laboratories certified under CLIA to perform high complexity testing. This test has not been FDA cleared or approved. A negative result does not rule out the presence of PCR inhibitors in the specimen or target RNA concentration below the limit of detection for the assay. The possibility of a false negative should be considered if the patient's recent exposure or clinical presentation suggests COVID-19. This test was validated by [facility] laboratory. This laboratory is certified under the Clinical Laboratory Improvement Amendments (CLIA) as qualified to perform high complexity testing.   Glucose by meter     Status: Abnormal   Result Value Ref Range    GLUCOSE BY METER POCT 234 (H) 70 - 99 mg/dL   Glucose by meter     Status: Abnormal   Result Value Ref Range    GLUCOSE BY METER POCT 159 (H) 70 - 99 mg/dL   Glucose by meter     Status: Abnormal   Result Value Ref Range    GLUCOSE BY METER POCT 211 (H) 70 - 99 mg/dL   Glucose by meter     Status: Abnormal   Result Value Ref Range    GLUCOSE BY METER POCT 180 (H) 70 - 99 mg/dL   Glucose by meter     Status: Abnormal   Result Value Ref Range    GLUCOSE BY METER POCT 182 (H) 70 - 99 mg/dL   Basic metabolic panel     Status: Abnormal   Result Value Ref Range    Sodium 133 (L) 136 - 145 mmol/L    Potassium 4.1 3.4 - 5.3 mmol/L    Chloride 99 98 - 107 mmol/L    Carbon Dioxide (CO2) 24 22 - 29 mmol/L    Anion Gap 10 7 - 15 mmol/L    Urea Nitrogen 11.9 6.0 - 20.0 mg/dL    Creatinine 0.61 (L) 0.67 - 1.17 mg/dL    Calcium 8.9 8.6 - 10.0 mg/dL    Glucose 264 (H) 70 - 99 mg/dL    GFR Estimate >90 >60 mL/min/1.73m2   Glucose by meter      Status: Abnormal   Result Value Ref Range    GLUCOSE BY METER POCT 275 (H) 70 - 99 mg/dL   Extra Tube     Status: None    Narrative    The following orders were created for panel order Extra Tube.  Procedure                               Abnormality         Status                     ---------                               -----------         ------                     Extra Purple Top Tube[[id number]]                            Final result                 Please view results for these tests on the individual orders.   Extra Purple Top Tube     Status: None   Result Value Ref Range    Hold Specimen [facility]    Glucose by meter     Status: Abnormal   Result Value Ref Range    GLUCOSE BY METER POCT 150 (H) 70 - 99 mg/dL   Glucose by meter     Status: Abnormal   Result Value Ref Range    GLUCOSE BY METER POCT 163 (H) 70 - 99 mg/dL   Glucose by meter     Status: Abnormal   Result Value Ref Range    GLUCOSE BY METER POCT 190 (H) 70 - 99 mg/dL

## 2023-02-22 ENCOUNTER — TELEPHONE (OUTPATIENT)
Dept: BEHAVIORAL HEALTH | Facility: HOSPITAL | Age: 28
End: 2023-02-22

## 2023-02-22 NOTE — TELEPHONE ENCOUNTER
He, was discharged to home on  2/15/23 from [facility]. I spoke today with him regarding the patient's discharge.    He indicates he has received sufficient information upon discharge. Medications were reviewed in full on discharge, including: Medications to be started; medications to be stopped; medications to be continued from preadmission and any side effects.   Prescriptions were e-scribed at discharge and were able to be filled. Yes: Reported no issues    Medications are being taken as prescribed.    He did not have any questions regarding discharge instructions or condition.